# Patient Record
Sex: FEMALE | Race: WHITE | NOT HISPANIC OR LATINO | Employment: UNEMPLOYED | URBAN - METROPOLITAN AREA
[De-identification: names, ages, dates, MRNs, and addresses within clinical notes are randomized per-mention and may not be internally consistent; named-entity substitution may affect disease eponyms.]

---

## 2021-07-20 ENCOUNTER — TRANSCRIBE ORDERS (OUTPATIENT)
Dept: OTHER | Facility: OTHER | Age: 26
End: 2021-07-20

## 2021-07-20 ENCOUNTER — LAB (OUTPATIENT)
Dept: LAB | Facility: HOSPITAL | Age: 26
End: 2021-07-20

## 2021-07-20 DIAGNOSIS — Z3A.10 10 WEEKS GESTATION OF PREGNANCY: ICD-10-CM

## 2021-07-20 DIAGNOSIS — Z34.81 PRENATAL CARE, SUBSEQUENT PREGNANCY, FIRST TRIMESTER: ICD-10-CM

## 2021-07-20 DIAGNOSIS — Z34.81 PRENATAL CARE, SUBSEQUENT PREGNANCY, FIRST TRIMESTER: Primary | ICD-10-CM

## 2021-07-20 LAB
ABO GROUP BLD: NORMAL
BLD GP AB SCN SERPL QL: NEGATIVE
RH BLD: POSITIVE
T&S EXPIRATION DATE: NORMAL

## 2021-07-20 PROCEDURE — 87086 URINE CULTURE/COLONY COUNT: CPT

## 2021-07-20 PROCEDURE — 86803 HEPATITIS C AB TEST: CPT

## 2021-07-20 PROCEDURE — 36415 COLL VENOUS BLD VENIPUNCTURE: CPT

## 2021-07-20 PROCEDURE — 87186 SC STD MICRODIL/AGAR DIL: CPT

## 2021-07-20 PROCEDURE — 86765 RUBEOLA ANTIBODY: CPT

## 2021-07-20 PROCEDURE — 86735 MUMPS ANTIBODY: CPT

## 2021-07-20 PROCEDURE — 87077 CULTURE AEROBIC IDENTIFY: CPT

## 2021-07-20 PROCEDURE — 80081 OBSTETRIC PANEL INC HIV TSTG: CPT

## 2021-07-21 LAB
BASOPHILS # BLD AUTO: 0.04 10*3/MM3 (ref 0–0.2)
BASOPHILS NFR BLD AUTO: 0.4 % (ref 0–1.5)
DEPRECATED RDW RBC AUTO: 40.7 FL (ref 37–54)
EOSINOPHIL # BLD AUTO: 0.01 10*3/MM3 (ref 0–0.4)
EOSINOPHIL NFR BLD AUTO: 0.1 % (ref 0.3–6.2)
ERYTHROCYTE [DISTWIDTH] IN BLOOD BY AUTOMATED COUNT: 11.9 % (ref 12.3–15.4)
HBV SURFACE AG SERPL QL IA: NORMAL
HCT VFR BLD AUTO: 39.2 % (ref 34–46.6)
HCV AB SER DONR QL: NORMAL
HGB BLD-MCNC: 13.2 G/DL (ref 12–15.9)
HIV1+2 AB SER QL: NORMAL
IMM GRANULOCYTES # BLD AUTO: 0.03 10*3/MM3 (ref 0–0.05)
IMM GRANULOCYTES NFR BLD AUTO: 0.3 % (ref 0–0.5)
LYMPHOCYTES # BLD AUTO: 1.6 10*3/MM3 (ref 0.7–3.1)
LYMPHOCYTES NFR BLD AUTO: 16.3 % (ref 19.6–45.3)
MCH RBC QN AUTO: 31.5 PG (ref 26.6–33)
MCHC RBC AUTO-ENTMCNC: 33.7 G/DL (ref 31.5–35.7)
MCV RBC AUTO: 93.6 FL (ref 79–97)
MEV IGG SER IA-ACNC: 209 AU/ML
MONOCYTES # BLD AUTO: 0.44 10*3/MM3 (ref 0.1–0.9)
MONOCYTES NFR BLD AUTO: 4.5 % (ref 5–12)
MUV IGG SER IA-ACNC: 18.9 AU/ML
NEUTROPHILS NFR BLD AUTO: 7.68 10*3/MM3 (ref 1.7–7)
NEUTROPHILS NFR BLD AUTO: 78.4 % (ref 42.7–76)
NRBC BLD AUTO-RTO: 0 /100 WBC (ref 0–0.2)
PLATELET # BLD AUTO: 194 10*3/MM3 (ref 140–450)
PMV BLD AUTO: 12.1 FL (ref 6–12)
RBC # BLD AUTO: 4.19 10*6/MM3 (ref 3.77–5.28)
RPR SER QL: NON REACTIVE
RUBV IGG SERPL IA-ACNC: 12.5 INDEX
WBC # BLD AUTO: 9.8 10*3/MM3 (ref 3.4–10.8)

## 2021-07-22 LAB — BACTERIA SPEC AEROBE CULT: ABNORMAL

## 2021-08-01 ENCOUNTER — HOSPITAL ENCOUNTER (EMERGENCY)
Facility: HOSPITAL | Age: 26
Discharge: HOME | End: 2021-08-01
Attending: EMERGENCY MEDICINE | Admitting: EMERGENCY MEDICINE

## 2021-08-01 VITALS
OXYGEN SATURATION: 100 % | RESPIRATION RATE: 18 BRPM | SYSTOLIC BLOOD PRESSURE: 105 MMHG | DIASTOLIC BLOOD PRESSURE: 66 MMHG | BODY MASS INDEX: 23.05 KG/M2 | WEIGHT: 135 LBS | HEIGHT: 64 IN | HEART RATE: 76 BPM | TEMPERATURE: 98 F

## 2021-08-01 DIAGNOSIS — O23.41 URINARY TRACT INFECTION IN MOTHER DURING FIRST TRIMESTER OF PREGNANCY: Primary | ICD-10-CM

## 2021-08-01 LAB
ALBUMIN SERPL-MCNC: 4.49 G/DL (ref 3.5–5.2)
ALBUMIN/GLOB SERPL: 1.8 G/DL
ALP SERPL-CCNC: 42 U/L (ref 39–117)
ALT SERPL W P-5'-P-CCNC: <5 U/L (ref 1–33)
ANION GAP SERPL CALCULATED.3IONS-SCNC: 12.9 MMOL/L (ref 5–15)
AST SERPL-CCNC: 13 U/L (ref 1–32)
BACTERIA UR QL AUTO: ABNORMAL /HPF
BASOPHILS # BLD AUTO: 0.04 10*3/MM3 (ref 0–0.2)
BASOPHILS NFR BLD AUTO: 0.4 % (ref 0–1.5)
BILIRUB SERPL-MCNC: 0.3 MG/DL (ref 0–1.2)
BILIRUB UR QL STRIP: NEGATIVE
BUN SERPL-MCNC: 7 MG/DL (ref 6–20)
BUN/CREAT SERPL: 10.3 (ref 7–25)
CALCIUM SPEC-SCNC: 9.4 MG/DL (ref 8.6–10.5)
CHLORIDE SERPL-SCNC: 104 MMOL/L (ref 98–107)
CLARITY UR: ABNORMAL
CO2 SERPL-SCNC: 20.1 MMOL/L (ref 22–29)
COLOR UR: ABNORMAL
CREAT SERPL-MCNC: 0.68 MG/DL (ref 0.57–1)
D-LACTATE SERPL-SCNC: 2 MMOL/L (ref 0.5–2)
DEPRECATED RDW RBC AUTO: 40.6 FL (ref 37–54)
EOSINOPHIL # BLD AUTO: 0.01 10*3/MM3 (ref 0–0.4)
EOSINOPHIL NFR BLD AUTO: 0.1 % (ref 0.3–6.2)
ERYTHROCYTE [DISTWIDTH] IN BLOOD BY AUTOMATED COUNT: 12.1 % (ref 12.3–15.4)
GFR SERPL CREATININE-BSD FRML MDRD: 104 ML/MIN/1.73
GLOBULIN UR ELPH-MCNC: 2.5 GM/DL
GLUCOSE SERPL-MCNC: 113 MG/DL (ref 65–99)
GLUCOSE UR STRIP-MCNC: NEGATIVE MG/DL
HCG INTACT+B SERPL-ACNC: NORMAL MIU/ML
HCT VFR BLD AUTO: 39 % (ref 34–46.6)
HGB BLD-MCNC: 13.1 G/DL (ref 12–15.9)
HGB UR QL STRIP.AUTO: NEGATIVE
HOLD SPECIMEN: NORMAL
HOLD SPECIMEN: NORMAL
HYALINE CASTS UR QL AUTO: ABNORMAL /LPF
IMM GRANULOCYTES # BLD AUTO: 0.03 10*3/MM3 (ref 0–0.05)
IMM GRANULOCYTES NFR BLD AUTO: 0.3 % (ref 0–0.5)
KETONES UR QL STRIP: NEGATIVE
LEUKOCYTE ESTERASE UR QL STRIP.AUTO: ABNORMAL
LIPASE SERPL-CCNC: 19 U/L (ref 13–60)
LYMPHOCYTES # BLD AUTO: 1.81 10*3/MM3 (ref 0.7–3.1)
LYMPHOCYTES NFR BLD AUTO: 18.2 % (ref 19.6–45.3)
MAGNESIUM SERPL-MCNC: 1.8 MG/DL (ref 1.6–2.6)
MCH RBC QN AUTO: 31 PG (ref 26.6–33)
MCHC RBC AUTO-ENTMCNC: 33.6 G/DL (ref 31.5–35.7)
MCV RBC AUTO: 92.2 FL (ref 79–97)
MONOCYTES # BLD AUTO: 0.51 10*3/MM3 (ref 0.1–0.9)
MONOCYTES NFR BLD AUTO: 5.1 % (ref 5–12)
NEUTROPHILS NFR BLD AUTO: 7.54 10*3/MM3 (ref 1.7–7)
NEUTROPHILS NFR BLD AUTO: 75.9 % (ref 42.7–76)
NITRITE UR QL STRIP: POSITIVE
NRBC BLD AUTO-RTO: 0 /100 WBC (ref 0–0.2)
PH UR STRIP.AUTO: 7.5 [PH] (ref 5–8)
PLATELET # BLD AUTO: 197 10*3/MM3 (ref 140–450)
PMV BLD AUTO: 10.2 FL (ref 6–12)
POTASSIUM SERPL-SCNC: 2.9 MMOL/L (ref 3.5–5.2)
PROT SERPL-MCNC: 7 G/DL (ref 6–8.5)
PROT UR QL STRIP: ABNORMAL
RBC # BLD AUTO: 4.23 10*6/MM3 (ref 3.77–5.28)
RBC # UR: ABNORMAL /HPF
REF LAB TEST METHOD: ABNORMAL
SODIUM SERPL-SCNC: 137 MMOL/L (ref 136–145)
SP GR UR STRIP: 1.02 (ref 1–1.03)
SQUAMOUS #/AREA URNS HPF: ABNORMAL /HPF
UROBILINOGEN UR QL STRIP: ABNORMAL
WBC # BLD AUTO: 9.94 10*3/MM3 (ref 3.4–10.8)
WBC UR QL AUTO: ABNORMAL /HPF
WHOLE BLOOD HOLD SPECIMEN: NORMAL

## 2021-08-01 PROCEDURE — 83605 ASSAY OF LACTIC ACID: CPT | Performed by: EMERGENCY MEDICINE

## 2021-08-01 PROCEDURE — 84702 CHORIONIC GONADOTROPIN TEST: CPT | Performed by: EMERGENCY MEDICINE

## 2021-08-01 PROCEDURE — 25010000002 CEFTRIAXONE PER 250 MG: Performed by: EMERGENCY MEDICINE

## 2021-08-01 PROCEDURE — 85025 COMPLETE CBC W/AUTO DIFF WBC: CPT | Performed by: EMERGENCY MEDICINE

## 2021-08-01 PROCEDURE — 80053 COMPREHEN METABOLIC PANEL: CPT | Performed by: EMERGENCY MEDICINE

## 2021-08-01 PROCEDURE — 83690 ASSAY OF LIPASE: CPT | Performed by: EMERGENCY MEDICINE

## 2021-08-01 PROCEDURE — 87040 BLOOD CULTURE FOR BACTERIA: CPT | Performed by: EMERGENCY MEDICINE

## 2021-08-01 PROCEDURE — 99284 EMERGENCY DEPT VISIT MOD MDM: CPT

## 2021-08-01 PROCEDURE — 83735 ASSAY OF MAGNESIUM: CPT | Performed by: EMERGENCY MEDICINE

## 2021-08-01 PROCEDURE — 96365 THER/PROPH/DIAG IV INF INIT: CPT

## 2021-08-01 PROCEDURE — 81001 URINALYSIS AUTO W/SCOPE: CPT | Performed by: EMERGENCY MEDICINE

## 2021-08-01 RX ORDER — ACETAMINOPHEN 325 MG/1
650 TABLET ORAL ONCE
Status: COMPLETED | OUTPATIENT
Start: 2021-08-01 | End: 2021-08-01

## 2021-08-01 RX ORDER — POTASSIUM CHLORIDE 20 MEQ/1
80 TABLET, EXTENDED RELEASE ORAL ONCE
Status: COMPLETED | OUTPATIENT
Start: 2021-08-01 | End: 2021-08-01

## 2021-08-01 RX ORDER — POTASSIUM CHLORIDE 750 MG/1
10 TABLET, FILM COATED, EXTENDED RELEASE ORAL DAILY
Qty: 7 TABLET | Refills: 0 | Status: SHIPPED | OUTPATIENT
Start: 2021-08-01 | End: 2021-08-08

## 2021-08-01 RX ORDER — NITROFURANTOIN 25; 75 MG/1; MG/1
100 CAPSULE ORAL 2 TIMES DAILY
Qty: 20 CAPSULE | Refills: 0 | Status: ON HOLD | OUTPATIENT
Start: 2021-08-01 | End: 2021-12-30

## 2021-08-01 RX ADMIN — ACETAMINOPHEN 650 MG: 325 TABLET ORAL at 08:41

## 2021-08-01 RX ADMIN — POTASSIUM CHLORIDE 80 MEQ: 20 TABLET, EXTENDED RELEASE ORAL at 08:41

## 2021-08-01 RX ADMIN — SODIUM CHLORIDE 2 G: 9 INJECTION, SOLUTION INTRAVENOUS at 08:42

## 2021-08-01 RX ADMIN — SODIUM CHLORIDE 1000 ML: 9 INJECTION, SOLUTION INTRAVENOUS at 07:41

## 2021-08-01 RX ADMIN — SODIUM CHLORIDE 1000 ML: 9 INJECTION, SOLUTION INTRAVENOUS at 06:45

## 2021-08-03 NOTE — ED PROVIDER NOTES
Subjective     History provided by:  Patient   used: No    Abdominal Pain  Pain location:  Generalized  Pain quality: aching, cramping and dull    Pain radiates to:  Does not radiate  Pain severity:  Mild (2/10 on the pain severity scale.)  Onset quality:  Gradual  Timing:  Constant  Progression:  Worsening  Chronicity:  New  Context: not alcohol use, not awakening from sleep, not diet changes, not eating, not laxative use, not medication withdrawal, not previous surgeries, not recent illness, not recent sexual activity, not recent travel, not retching, not sick contacts, not suspicious food intake and not trauma    Relieved by:  Nothing  Worsened by:  Nothing  Ineffective treatments:  None tried  Associated symptoms: nausea    Associated symptoms: no anorexia, no belching, no chest pain, no chills, no constipation, no cough, no diarrhea, no dysuria, no fatigue, no fever, no flatus, no hematemesis, no hematochezia, no hematuria, no melena, no shortness of breath, no sore throat, no vaginal bleeding, no vaginal discharge and no vomiting    Risk factors: no alcohol abuse, no aspirin use, not elderly, has not had multiple surgeries, no NSAID use, not obese, not pregnant and no recent hospitalization        Review of Systems   Constitutional: Negative for activity change, appetite change, chills, diaphoresis, fatigue and fever.   HENT: Negative for congestion, ear pain and sore throat.    Eyes: Negative for redness.   Respiratory: Negative for cough, chest tightness, shortness of breath and wheezing.    Cardiovascular: Negative for chest pain, palpitations and leg swelling.   Gastrointestinal: Positive for abdominal pain and nausea. Negative for anorexia, constipation, diarrhea, flatus, hematemesis, hematochezia, melena and vomiting.   Genitourinary: Negative for dysuria, hematuria, urgency, vaginal bleeding and vaginal discharge.   Musculoskeletal: Negative for arthralgias, back pain, myalgias and  neck pain.   Skin: Negative for pallor, rash and wound.   Neurological: Negative for dizziness, speech difficulty, weakness and headaches.   Psychiatric/Behavioral: Negative for agitation, behavioral problems, confusion and decreased concentration.   All other systems reviewed and are negative.      No past medical history on file.    No Known Allergies    No past surgical history on file.    No family history on file.    Social History     Socioeconomic History   • Marital status:      Spouse name: Not on file   • Number of children: Not on file   • Years of education: Not on file   • Highest education level: Not on file           Objective   Physical Exam  Vitals and nursing note reviewed.   Constitutional:       General: She is not in acute distress.     Appearance: Normal appearance. She is well-developed. She is not toxic-appearing or diaphoretic.   HENT:      Head: Normocephalic and atraumatic.      Right Ear: External ear normal.      Left Ear: External ear normal.      Nose: Nose normal.      Mouth/Throat:      Pharynx: No oropharyngeal exudate.      Tonsils: No tonsillar exudate.   Eyes:      General: Lids are normal.      Conjunctiva/sclera: Conjunctivae normal.      Pupils: Pupils are equal, round, and reactive to light.   Neck:      Thyroid: No thyromegaly.   Cardiovascular:      Rate and Rhythm: Normal rate and regular rhythm.      Pulses: Normal pulses.      Heart sounds: Normal heart sounds, S1 normal and S2 normal.   Pulmonary:      Effort: Pulmonary effort is normal. No tachypnea or respiratory distress.      Breath sounds: Normal breath sounds. No decreased breath sounds, wheezing or rales.   Chest:      Chest wall: No tenderness.   Abdominal:      General: Bowel sounds are normal. There is no distension.      Palpations: Abdomen is soft.      Tenderness: There is abdominal tenderness. There is no guarding or rebound.   Musculoskeletal:         General: No tenderness or deformity. Normal  range of motion.      Cervical back: Full passive range of motion without pain, normal range of motion and neck supple.   Lymphadenopathy:      Cervical: No cervical adenopathy.   Skin:     General: Skin is warm and dry.      Coloration: Skin is not pale.      Findings: No erythema or rash.   Neurological:      Mental Status: She is alert and oriented to person, place, and time.      GCS: GCS eye subscore is 4. GCS verbal subscore is 5. GCS motor subscore is 6.      Cranial Nerves: No cranial nerve deficit.      Sensory: No sensory deficit.   Psychiatric:         Speech: Speech normal.         Behavior: Behavior normal.         Thought Content: Thought content normal.         Judgment: Judgment normal.         Procedures           ED Course                                           MDM  Number of Diagnoses or Management Options  Urinary tract infection in mother during first trimester of pregnancy: new and requires workup     Amount and/or Complexity of Data Reviewed  Clinical lab tests: reviewed and ordered  Tests in the radiology section of CPT®: ordered and reviewed  Tests in the medicine section of CPT®: ordered and reviewed  Review and summarize past medical records: yes  Independent visualization of images, tracings, or specimens: yes    Risk of Complications, Morbidity, and/or Mortality  Presenting problems: moderate  Diagnostic procedures: moderate  Management options: moderate    Patient Progress  Patient progress: stable      Final diagnoses:   Urinary tract infection in mother during first trimester of pregnancy       ED Disposition  ED Disposition     ED Disposition Condition Comment    Discharge Stable           PATIENT CONNECTION - [facility]  See Provider List  [facility] [place] [address]  [phone]  Schedule an appointment as soon as possible for a visit in 1 day  EVALUATE         Medication List      New Prescriptions    nitrofurantoin (macrocrystal-monohydrate) 100 MG capsule  Commonly known as:  MACROBID  Take 1 capsule by mouth 2 (Two) Times a Day.     potassium chloride 10 MEQ CR tablet  Take 1 tablet by mouth Daily for 7 days.           Where to Get Your Medications      You can get these medications from any pharmacy    Bring a paper prescription for each of these medications  · nitrofurantoin (macrocrystal-monohydrate) 100 MG capsule  · potassium chloride 10 MEQ CR tablet          Cristóbal Menard MD  08/03/21 [id number]

## 2021-08-06 LAB
BACTERIA SPEC AEROBE CULT: NORMAL
BACTERIA SPEC AEROBE CULT: NORMAL

## 2021-11-17 LAB
EXTERNAL GLUCOSE TOLERANCE TEST FASTING: 76 MG/DL
EXTERNAL GTT 1 HOUR: 147
EXTERNAL GTT 3 HOURS: 112

## 2021-12-06 ENCOUNTER — HOSPITAL ENCOUNTER (OUTPATIENT)
Facility: HOSPITAL | Age: 26
Discharge: HOME | End: 2021-12-06
Attending: OBSTETRICS & GYNECOLOGY | Admitting: OBSTETRICS & GYNECOLOGY

## 2021-12-06 VITALS
DIASTOLIC BLOOD PRESSURE: 64 MMHG | OXYGEN SATURATION: 100 % | HEART RATE: 90 BPM | WEIGHT: 121.8 LBS | SYSTOLIC BLOOD PRESSURE: 109 MMHG | BODY MASS INDEX: 20.79 KG/M2 | HEIGHT: 64 IN | RESPIRATION RATE: 18 BRPM | TEMPERATURE: 98.7 F

## 2021-12-06 PROBLEM — O60.00 PRETERM LABOR: Status: ACTIVE | Noted: 2021-12-06

## 2021-12-06 PROCEDURE — 96372 THER/PROPH/DIAG INJ SC/IM: CPT

## 2021-12-06 PROCEDURE — 25010000002 BETAMETHASONE ACET & SOD PHOS PER 4 MG: Performed by: OBSTETRICS & GYNECOLOGY

## 2021-12-06 PROCEDURE — 59025 FETAL NON-STRESS TEST: CPT

## 2021-12-06 PROCEDURE — G0463 HOSPITAL OUTPT CLINIC VISIT: HCPCS

## 2021-12-06 RX ORDER — BETAMETHASONE SODIUM PHOSPHATE AND BETAMETHASONE ACETATE 3; 3 MG/ML; MG/ML
12 INJECTION, SUSPENSION INTRA-ARTICULAR; INTRALESIONAL; INTRAMUSCULAR; SOFT TISSUE EVERY 24 HOURS
Status: DISCONTINUED | OUTPATIENT
Start: 2021-12-06 | End: 2021-12-06 | Stop reason: HOSPADM

## 2021-12-06 RX ADMIN — BETAMETHASONE SODIUM PHOSPHATE AND BETAMETHASONE ACETATE 12 MG: 3; 3 INJECTION, SUSPENSION INTRA-ARTICULAR; INTRALESIONAL; INTRAMUSCULAR at 17:31

## 2021-12-06 NOTE — NON STRESS TEST
Dulce Boateng, a  at 29w6d with an JOSE of 2/15/2022, Date entered prior to episode creation, was seen at [facility] LABOR DELIVERY for a nonstress test.    Chief Complaint   Patient presents with   • Decreased Fetal Movement     nst and celestone       Patient Active Problem List   Diagnosis   •  labor       Start Time:   Stop Time:     Interpretation A  Nonstress Test Interpretation A: Reactive (21 : Neyda Downey, RN)  Comments A: Verified by HARMONY Pandya RN (21 : Neyda Downey, RN)

## 2021-12-07 ENCOUNTER — HOSPITAL ENCOUNTER (OUTPATIENT)
Facility: HOSPITAL | Age: 26
Discharge: HOME | End: 2021-12-07
Attending: OBSTETRICS & GYNECOLOGY | Admitting: OBSTETRICS & GYNECOLOGY

## 2021-12-07 ENCOUNTER — HOSPITAL ENCOUNTER (OUTPATIENT)
Facility: HOSPITAL | Age: 26
End: 2021-12-07
Attending: OBSTETRICS & GYNECOLOGY | Admitting: OBSTETRICS & GYNECOLOGY

## 2021-12-07 VITALS
TEMPERATURE: 98.5 F | RESPIRATION RATE: 18 BRPM | HEIGHT: 64 IN | BODY MASS INDEX: 25.9 KG/M2 | WEIGHT: 151.7 LBS | HEART RATE: 94 BPM | DIASTOLIC BLOOD PRESSURE: 72 MMHG | SYSTOLIC BLOOD PRESSURE: 112 MMHG

## 2021-12-07 PROBLEM — Z34.90 PREGNANCY: Status: ACTIVE | Noted: 2021-12-07

## 2021-12-07 PROCEDURE — 96372 THER/PROPH/DIAG INJ SC/IM: CPT

## 2021-12-07 PROCEDURE — 25010000002 BETAMETHASONE ACET & SOD PHOS PER 4 MG: Performed by: OBSTETRICS & GYNECOLOGY

## 2021-12-07 PROCEDURE — G0463 HOSPITAL OUTPT CLINIC VISIT: HCPCS

## 2021-12-07 PROCEDURE — 59025 FETAL NON-STRESS TEST: CPT

## 2021-12-07 RX ORDER — BETAMETHASONE SODIUM PHOSPHATE AND BETAMETHASONE ACETATE 3; 3 MG/ML; MG/ML
12 INJECTION, SUSPENSION INTRA-ARTICULAR; INTRALESIONAL; INTRAMUSCULAR; SOFT TISSUE EVERY 24 HOURS
Status: COMPLETED | OUTPATIENT
Start: 2021-12-07 | End: 2021-12-07

## 2021-12-07 RX ADMIN — BETAMETHASONE SODIUM PHOSPHATE AND BETAMETHASONE ACETATE 12 MG: 3; 3 INJECTION, SUSPENSION INTRA-ARTICULAR; INTRALESIONAL; INTRAMUSCULAR at 16:11

## 2021-12-07 NOTE — NON STRESS TEST
Dulce Boateng, a  at 30w0d with an JOSE of 2/15/2022, Date entered prior to episode creation, was seen at [facility] LABOR DELIVERY for a nonstress test.    Chief Complaint   Patient presents with   • Non-stress Test     Receiving second dose of Celestone       Patient Active Problem List   Diagnosis   •  labor   • Pregnancy       Start Time:   Stop Time: 163    Interpretation A  Nonstress Test Interpretation A: Reactive (21 : Neyda Downey, RN)  Comments A: Verified by NAYA Terrazas RN (21 : Neyda Downey, RN)

## 2021-12-14 ENCOUNTER — TELEPHONE (OUTPATIENT)
Dept: OBSTETRICS AND GYNECOLOGY | Facility: HOSPITAL | Age: 26
End: 2021-12-14

## 2021-12-14 LAB
EXTERNAL CHLAMYDIA SCREEN: NORMAL
EXTERNAL GONORRHEA SCREEN: NORMAL

## 2021-12-14 NOTE — TELEPHONE ENCOUNTER
Maternal Child Initial Intake    Patient Name: Dulce Boateng     Preferred Name: Dulce     YOB: 1995     Patient E-mail Address: [email or web address]                Patient gives permission for information/resources to be emailed: yes    Currently Employed: No    How well do you speak English? Very Well     Services: No    Language Spoken/Preferred English    Willingness to participate in [employer]: yes    Home Phone: [phone]  Cell Phone: [phone]  Alternate/Work/School Phone: none  Best Time for Contacting: anytime  Best Method for Contacting: Home  May we contact you by phone: yes  May we contact you by mail: yes  Highest Level of Education to Date: Associate Degree (e.g., AA, AS)    Professional Contacts  Type of Provider Name Next Appointment   OB/GYN Provider:     Primary Care Provider:       Dental Provider:     Speciality Provider:      Pediatrician Chosen         Have you seen dentist in last 6 months: [] YES    [] NO    Other Providers/Services Presently Utilizing:   None    Pregnancy Confirmed: Yes  No LMP recorded. Patient is pregnant. No LMP recorded. Patient is pregnant.   JOSE: 02/15/2022 Based Upon Other  Feeding Plan: [x] Breast [] Bottle  Current Pregnancy Related Symptoms, Concerns, or Questions: none  No Known Allergies   Prior to Admission medications    Medication Sig Start Date End Date Taking? Authorizing Provider   folic acid (FOLVITE) 1 MG tablet Take 1 tablet by mouth Daily. 6/29/21      nitrofurantoin, macrocrystal-monohydrate, (MACROBID) 100 MG capsule Take 1 capsule by mouth 2 (Two) Times a Day. 8/1/21   Cristóbal Menard MD   Prenatal Vit-Fe Fumarate-FA (Prenatal Vitamin) 27-0.8 MG tablet Take 1 tablet by mouth Daily. 6/29/21      Progesterone (PROMETRIUM) 100 MG capsule Take 2 capsules by mouth Every Evening. 6/29/21         OB/GYN Specific History:   None    No LMP recorded. Patient is pregnant.   Estimated Date of Delivery: 2/15/22    Pregnancy History:   Social History:   Sexually Active: Yes Partners: Male  Birth Control/Protection Post Delivery: undecided    Pregnancy History:  Current Pregnancy Baby Sex: Female  Date: GA (wks) Birth Wt Sex Del Type Place of Del Comments/Complications Living (Y/N)                                                                           Past Medical History:   Diagnosis Date   • Migraine       Past Surgical History:   Procedure Laterality Date   • TEAR DUCT SURGERY Bilateral 1994   • TONSILLECTOMY        Implants: none  Family History   Problem Relation Age of Onset   • Asthma Daughter    • Hypertension Maternal Grandmother    • COPD Paternal Grandfather    • Diabetes Paternal Grandfather         Social History:  Smoking Status: Never a smoker.  Cigarettes   Passive Exposure: yes  Counseling Given: yes  Smokeless Tobacco: Never   Alcohol Use: No   Drinks/wk: 0   Substance Use History: No  Substances Used & Use/Wk: 0    [facility] Domestic Violence/Abuse Screening  Within the last year, have you been:  Humiliated or emotionally abused in other ways by your partner or ex-partner no  Afraid of your partner or ex-partner no  Raped or forced to have any kind of sexual activity by your partner or ex-partner no  Kicked, hit, slapped, or otherwise physically hurt by your partner or ex-partner no  Feels Unsafe at home or work/school: no  Feels Threatened by Someone no  Does anyone try to keep you from having contact with others/doing things outside your home: no  History of physical, mental, emotional, financial abuse/neglect: no    Spiritual, Cultural, Religous, Value Awareness  Any Spiritual, Cultural, or Jew Beliefs/Practices/Values that we need to be mindful of throughout your maternity experience: no    Resource/Environmental Concerns:  Current Living Arrangement: home/apt/condo  Resource/Environmental Concerns:   None    Disability/Function:  Do you have:  Difficulty Hearing or Deaf: no  Difficulty  Seeing or Blind: no  Difficulty Concentrating or with Decision-Making: no  Difficulty Communicating: no  Difficulty with Comprehension/Understanding of Information Provided: no  Difficulty with Performing Activities of Daily Living: no    Accommodations/Assistive Devices Utilized (e.g. hearing aids, sign language, braille, service animal, /aide, etc.): none    Equipment Presently Utilized/Available at Home: Other none  Education:   Preferred Language for Discussing Healthcare: English  Ability to Read: yes  Ability to Write: yes    Preferred learning method: listening, reading and demonstration  Learning Barriers: none  Topic Education Information Comments        Hospital Education Programs [] Provided                         [] Acknowledged                [] Refused    [facility] Maternal-Child Department [] Provided                         [] Acknowledged                [] Refused    Signs or Symptoms to Report [] Provided                         [] Acknowledged                [] Refused    Other:  [] Provided                         [] Acknowledged                [] Refused    Comments:       Significant Other/Support Person: Name:Griffin Boateng                        Relationship: spouse  Do you have [facility]?  [x] YES    [] NO   Specific Resources Provided and Method: How to find a pediatrician, How to find a primary care provider, Insurance Benefits/Incentives, Smoking/Vaping Sensation, SNAP Benefits, WIC Benefits Sent via: [facility]    Do you have the [employer]?  [] YES    [x] NO     Intake Summary   [x] Intake completed, will follow-up with patient: yes  [x] Discussed community resources and information provided or assisted with appointments (see notes)  [] Other, including any provider notifications (see notes)  [] Declines Project [employer] Participation  Oxana Long RN   Maternal Nurse Navigator

## 2021-12-29 ENCOUNTER — OFFICE VISIT (OUTPATIENT)
Dept: SURGERY | Facility: CLINIC | Age: 26
End: 2021-12-29

## 2021-12-29 VITALS
DIASTOLIC BLOOD PRESSURE: 70 MMHG | HEART RATE: 62 BPM | WEIGHT: 153 LBS | HEIGHT: 64 IN | BODY MASS INDEX: 26.12 KG/M2 | SYSTOLIC BLOOD PRESSURE: 110 MMHG

## 2021-12-29 DIAGNOSIS — Z3A.33 33 WEEKS GESTATION OF PREGNANCY: Primary | ICD-10-CM

## 2021-12-29 DIAGNOSIS — K64.2 GRADE III HEMORRHOIDS: ICD-10-CM

## 2021-12-29 PROCEDURE — 99203 OFFICE O/P NEW LOW 30 MIN: CPT | Performed by: SURGERY

## 2021-12-29 RX ORDER — PRAMOXINE HYDROCHLORIDE 10 MG/G
AEROSOL, FOAM TOPICAL EVERY 8 HOURS PRN
Qty: 15 G | Refills: 1 | Status: ON HOLD | OUTPATIENT
Start: 2021-12-29 | End: 2021-12-30

## 2021-12-30 ENCOUNTER — HOSPITAL ENCOUNTER (INPATIENT)
Facility: HOSPITAL | Age: 26
LOS: 2 days | Discharge: HOME | End: 2022-01-01
Attending: OBSTETRICS & GYNECOLOGY | Admitting: OBSTETRICS & GYNECOLOGY

## 2021-12-30 LAB
ABO GROUP BLD: NORMAL
BILIRUB UR QL STRIP: NEGATIVE
BLD GP AB SCN SERPL QL: NEGATIVE
CLARITY UR: ABNORMAL
COLOR UR: YELLOW
DEPRECATED RDW RBC AUTO: 43.1 FL (ref 37–54)
ERYTHROCYTE [DISTWIDTH] IN BLOOD BY AUTOMATED COUNT: 13.1 % (ref 12.3–15.4)
GLUCOSE UR STRIP-MCNC: NEGATIVE MG/DL
HCT VFR BLD AUTO: 35.3 % (ref 34–46.6)
HGB BLD-MCNC: 11.2 G/DL (ref 12–15.9)
HGB UR QL STRIP.AUTO: NEGATIVE
KETONES UR QL STRIP: NEGATIVE
LEUKOCYTE ESTERASE UR QL STRIP.AUTO: NEGATIVE
MCH RBC QN AUTO: 29.2 PG (ref 26.6–33)
MCHC RBC AUTO-ENTMCNC: 31.7 G/DL (ref 31.5–35.7)
MCV RBC AUTO: 91.9 FL (ref 79–97)
NITRITE UR QL STRIP: NEGATIVE
PH UR STRIP.AUTO: 7.5 [PH] (ref 5–8)
PLATELET # BLD AUTO: 188 10*3/MM3 (ref 140–450)
PMV BLD AUTO: 11.5 FL (ref 6–12)
PROT UR QL STRIP: NEGATIVE
RBC # BLD AUTO: 3.84 10*6/MM3 (ref 3.77–5.28)
RH BLD: POSITIVE
SARS-COV-2 RNA RESP QL NAA+PROBE: NOT DETECTED
SP GR UR STRIP: 1.02 (ref 1–1.03)
T&S EXPIRATION DATE: NORMAL
UROBILINOGEN UR QL STRIP: ABNORMAL
WBC NRBC COR # BLD: 10.87 10*3/MM3 (ref 3.4–10.8)

## 2021-12-30 PROCEDURE — 86901 BLOOD TYPING SEROLOGIC RH(D): CPT | Performed by: OBSTETRICS & GYNECOLOGY

## 2021-12-30 PROCEDURE — 86900 BLOOD TYPING SEROLOGIC ABO: CPT | Performed by: OBSTETRICS & GYNECOLOGY

## 2021-12-30 PROCEDURE — 86850 RBC ANTIBODY SCREEN: CPT | Performed by: OBSTETRICS & GYNECOLOGY

## 2021-12-30 PROCEDURE — 0 MAGNESIUM SULFATE 20 GM/500ML SOLUTION: Performed by: OBSTETRICS & GYNECOLOGY

## 2021-12-30 PROCEDURE — 25010000002 BETAMETHASONE ACET & SOD PHOS PER 4 MG: Performed by: OBSTETRICS & GYNECOLOGY

## 2021-12-30 PROCEDURE — 81003 URINALYSIS AUTO W/O SCOPE: CPT | Performed by: OBSTETRICS & GYNECOLOGY

## 2021-12-30 PROCEDURE — 59025 FETAL NON-STRESS TEST: CPT

## 2021-12-30 PROCEDURE — 25010000002 BUTORPHANOL PER 1 MG: Performed by: OBSTETRICS & GYNECOLOGY

## 2021-12-30 PROCEDURE — 25010000002 ONDANSETRON PER 1 MG: Performed by: OBSTETRICS & GYNECOLOGY

## 2021-12-30 PROCEDURE — 85027 COMPLETE CBC AUTOMATED: CPT | Performed by: OBSTETRICS & GYNECOLOGY

## 2021-12-30 PROCEDURE — G0463 HOSPITAL OUTPT CLINIC VISIT: HCPCS

## 2021-12-30 PROCEDURE — U0003 INFECTIOUS AGENT DETECTION BY NUCLEIC ACID (DNA OR RNA); SEVERE ACUTE RESPIRATORY SYNDROME CORONAVIRUS 2 (SARS-COV-2) (CORONAVIRUS DISEASE [COVID-19]), AMPLIFIED PROBE TECHNIQUE, MAKING USE OF HIGH THROUGHPUT TECHNOLOGIES AS DESCRIBED BY CMS-2020-01-R: HCPCS | Performed by: OBSTETRICS & GYNECOLOGY

## 2021-12-30 PROCEDURE — P9612 CATHETERIZE FOR URINE SPEC: HCPCS

## 2021-12-30 RX ORDER — SODIUM CHLORIDE, SODIUM LACTATE, POTASSIUM CHLORIDE, CALCIUM CHLORIDE 600; 310; 30; 20 MG/100ML; MG/100ML; MG/100ML; MG/100ML
75 INJECTION, SOLUTION INTRAVENOUS CONTINUOUS
Status: DISCONTINUED | OUTPATIENT
Start: 2021-12-30 | End: 2022-01-01 | Stop reason: HOSPADM

## 2021-12-30 RX ORDER — ONDANSETRON 2 MG/ML
4 INJECTION INTRAMUSCULAR; INTRAVENOUS EVERY 6 HOURS PRN
Status: DISCONTINUED | OUTPATIENT
Start: 2021-12-30 | End: 2022-01-01 | Stop reason: HOSPADM

## 2021-12-30 RX ORDER — ACETAMINOPHEN 325 MG/1
650 TABLET ORAL EVERY 6 HOURS PRN
Status: DISCONTINUED | OUTPATIENT
Start: 2021-12-30 | End: 2022-01-01 | Stop reason: HOSPADM

## 2021-12-30 RX ORDER — BUTORPHANOL TARTRATE 1 MG/ML
1 INJECTION, SOLUTION INTRAMUSCULAR; INTRAVENOUS
Status: DISCONTINUED | OUTPATIENT
Start: 2021-12-30 | End: 2022-01-01 | Stop reason: HOSPADM

## 2021-12-30 RX ORDER — BETAMETHASONE SODIUM PHOSPHATE AND BETAMETHASONE ACETATE 3; 3 MG/ML; MG/ML
12 INJECTION, SUSPENSION INTRA-ARTICULAR; INTRALESIONAL; INTRAMUSCULAR; SOFT TISSUE ONCE
Status: COMPLETED | OUTPATIENT
Start: 2021-12-30 | End: 2021-12-30

## 2021-12-30 RX ORDER — MAGNESIUM SULFATE HEPTAHYDRATE 40 MG/ML
INJECTION, SOLUTION INTRAVENOUS
Status: COMPLETED
Start: 2021-12-30 | End: 2021-12-30

## 2021-12-30 RX ORDER — MAGNESIUM SULFATE HEPTAHYDRATE 40 MG/ML
2 INJECTION, SOLUTION INTRAVENOUS CONTINUOUS
Status: DISCONTINUED | OUTPATIENT
Start: 2021-12-30 | End: 2022-01-01 | Stop reason: HOSPADM

## 2021-12-30 RX ORDER — HYDROXYPROGESTERONE CAPROATE 250 MG/ML
250 INJECTION INTRAMUSCULAR
Status: ON HOLD | COMMUNITY
End: 2022-01-07

## 2021-12-30 RX ORDER — HYDROXYPROGESTERONE CAPROATE 250 MG/ML
250 INJECTION INTRAMUSCULAR
Status: CANCELLED | OUTPATIENT
Start: 2021-12-31

## 2021-12-30 RX ADMIN — ACETAMINOPHEN 650 MG: 325 TABLET ORAL at 21:25

## 2021-12-30 RX ADMIN — WITCH HAZEL: 500 SOLUTION RECTAL; TOPICAL at 21:25

## 2021-12-30 RX ADMIN — MAGNESIUM SULFATE IN WATER 2 G/HR: 40 INJECTION, SOLUTION INTRAVENOUS at 21:21

## 2021-12-30 RX ADMIN — BUTORPHANOL TARTRATE 1 MG: 1 INJECTION, SOLUTION INTRAMUSCULAR; INTRAVENOUS at 21:33

## 2021-12-30 RX ADMIN — MAGNESIUM SULFATE HEPTAHYDRATE 4 G: 40 INJECTION, SOLUTION INTRAVENOUS at 20:50

## 2021-12-30 RX ADMIN — SODIUM CHLORIDE, POTASSIUM CHLORIDE, SODIUM LACTATE AND CALCIUM CHLORIDE 75 ML/HR: 600; 310; 30; 20 INJECTION, SOLUTION INTRAVENOUS at 20:40

## 2021-12-30 RX ADMIN — ONDANSETRON 4 MG: 2 INJECTION INTRAMUSCULAR; INTRAVENOUS at 21:16

## 2021-12-30 RX ADMIN — BETAMETHASONE SODIUM PHOSPHATE AND BETAMETHASONE ACETATE 12 MG: 3; 3 INJECTION, SUSPENSION INTRA-ARTICULAR; INTRALESIONAL; INTRAMUSCULAR at 21:17

## 2021-12-30 RX ADMIN — BENZOCAINE AND LEVOMENTHOL: 200; 5 SPRAY TOPICAL at 21:25

## 2021-12-30 RX ADMIN — BENZOCAINE: 5.6 OINTMENT TOPICAL at 21:25

## 2021-12-30 NOTE — PROGRESS NOTES
Subjective   Dulce Boateng is a 26 y.o. female is being seen for consultation today at the request of Provider, No Known    Dulce Boateng is a 26 y.o. female Who is currently 33 weeks pregnant presenting with severely inflamed external hemorrhoids with grade 3 internal component.  No evidence of ischemia or other complication.  She has only used over-the-counter topical therapy at this time.      Past Medical History:   Diagnosis Date   • Migraine        Family History   Problem Relation Age of Onset   • Asthma Daughter    • Hypertension Maternal Grandmother    • COPD Paternal Grandfather    • Diabetes Paternal Grandfather        Social History     Socioeconomic History   • Marital status:      Spouse name: Griffin Boateng   Tobacco Use   • Smoking status: Never Smoker   • Smokeless tobacco: Never Used   Vaping Use   • Vaping Use: Never used   Substance and Sexual Activity   • Alcohol use: Never   • Drug use: Never       Past Surgical History:   Procedure Laterality Date   • TEAR DUCT SURGERY Bilateral 05/01/1994   • TONSILLECTOMY         Review of Systems   Constitutional: Negative for activity change, appetite change, chills and fever.   HENT: Negative for sore throat and trouble swallowing.    Eyes: Negative for visual disturbance.   Respiratory: Negative for cough and shortness of breath.    Cardiovascular: Negative for chest pain and palpitations.   Gastrointestinal: Positive for rectal pain. Negative for abdominal distention, abdominal pain, blood in stool, constipation, diarrhea, nausea and vomiting.   Endocrine: Negative for cold intolerance and heat intolerance.   Genitourinary: Negative for dysuria.   Musculoskeletal: Negative for joint swelling.   Skin: Negative for color change, rash and wound.   Allergic/Immunologic: Negative for immunocompromised state.   Neurological: Negative for dizziness, seizures, weakness and headaches.   Hematological: Negative for adenopathy. Does not bruise/bleed easily.  "  Psychiatric/Behavioral: Negative for agitation and confusion.         /70   Pulse 62   Ht 162.6 cm (64.02\")   Wt 69.4 kg (153 lb)   BMI 26.25 kg/m²   Objective   Physical Exam  Constitutional:       Appearance: She is well-developed.   HENT:      Head: Normocephalic and atraumatic.   Eyes:      Conjunctiva/sclera: Conjunctivae normal.      Pupils: Pupils are equal, round, and reactive to light.   Neck:      Thyroid: No thyromegaly.      Vascular: No JVD.      Trachea: No tracheal deviation.   Cardiovascular:      Rate and Rhythm: Normal rate and regular rhythm.      Heart sounds: No murmur heard.  No friction rub. No gallop.    Pulmonary:      Effort: Pulmonary effort is normal.      Breath sounds: Normal breath sounds.   Abdominal:      General: There is no distension.      Palpations: Abdomen is soft. There is no hepatomegaly or splenomegaly.      Tenderness: There is no abdominal tenderness.      Hernia: No hernia is present.   Genitourinary:     Rectum: External hemorrhoid and internal hemorrhoid present.   Musculoskeletal:         General: No deformity. Normal range of motion.      Cervical back: Neck supple.   Skin:     General: Skin is warm and dry.   Neurological:      Mental Status: She is alert and oriented to person, place, and time.               Assessment   Diagnoses and all orders for this visit:    1. 33 weeks gestation of pregnancy (Primary)    2. Grade III hemorrhoids    Other orders  -     Pramoxine HCl, Perianal, (Proctofoam) 1 % foam; Insert  into the rectum Every 8 (Eight) Hours As Needed for Hemorrhoids.  Dispense: 15 g; Refill: 1      Dulce Boateng is a 26 y.o. female with 33-week pregnancy and grade 3 hemorrhoids with inflamed external hemorrhoids.  Due to her underlying pregnancy excisional hemorrhoidectomy would carry high risk for  labor and complication.  She will continue medical management until the postpartum period where we will schedule " hemorrhoidectomy.    Patient's Body mass index is 26.25 kg/m². indicating that she is within normal range (BMI 18.5-24.9). No BMI management plan needed..

## 2021-12-31 ENCOUNTER — TELEPHONE (OUTPATIENT)
Dept: OBSTETRICS AND GYNECOLOGY | Facility: HOSPITAL | Age: 26
End: 2021-12-31

## 2021-12-31 LAB — MAGNESIUM SERPL-MCNC: 5.7 MG/DL (ref 1.6–2.6)

## 2021-12-31 PROCEDURE — 83735 ASSAY OF MAGNESIUM: CPT | Performed by: OBSTETRICS & GYNECOLOGY

## 2021-12-31 PROCEDURE — 25010000002 BUTORPHANOL PER 1 MG: Performed by: OBSTETRICS & GYNECOLOGY

## 2021-12-31 PROCEDURE — 0 MAGNESIUM SULFATE 20 GM/500ML SOLUTION: Performed by: OBSTETRICS & GYNECOLOGY

## 2021-12-31 PROCEDURE — G0378 HOSPITAL OBSERVATION PER HR: HCPCS

## 2021-12-31 PROCEDURE — 59025 FETAL NON-STRESS TEST: CPT

## 2021-12-31 RX ORDER — FOLIC ACID 1 MG/1
1000 TABLET ORAL DAILY
Status: DISCONTINUED | OUTPATIENT
Start: 2021-12-31 | End: 2022-01-01 | Stop reason: HOSPADM

## 2021-12-31 RX ORDER — PRENATAL VIT/IRON FUM/FOLIC AC 27MG-0.8MG
1 TABLET ORAL DAILY
Status: DISCONTINUED | OUTPATIENT
Start: 2021-12-31 | End: 2022-01-01 | Stop reason: HOSPADM

## 2021-12-31 RX ADMIN — SODIUM CHLORIDE, POTASSIUM CHLORIDE, SODIUM LACTATE AND CALCIUM CHLORIDE 75 ML/HR: 600; 310; 30; 20 INJECTION, SOLUTION INTRAVENOUS at 23:08

## 2021-12-31 RX ADMIN — MAGNESIUM SULFATE IN WATER 2.5 G/HR: 40 INJECTION, SOLUTION INTRAVENOUS at 03:25

## 2021-12-31 RX ADMIN — SODIUM CHLORIDE, POTASSIUM CHLORIDE, SODIUM LACTATE AND CALCIUM CHLORIDE 75 ML/HR: 600; 310; 30; 20 INJECTION, SOLUTION INTRAVENOUS at 12:23

## 2021-12-31 RX ADMIN — MAGNESIUM SULFATE IN WATER 2 G/HR: 40 INJECTION, SOLUTION INTRAVENOUS at 23:08

## 2021-12-31 RX ADMIN — MAGNESIUM SULFATE IN WATER 2 G/HR: 40 INJECTION, SOLUTION INTRAVENOUS at 14:12

## 2021-12-31 RX ADMIN — BUTORPHANOL TARTRATE 1 MG: 1 INJECTION, SOLUTION INTRAMUSCULAR; INTRAVENOUS at 01:53

## 2021-12-31 RX ADMIN — PRENATAL VIT W/ FE FUMARATE-FA TAB 27-0.8 MG 1 TABLET: 27-0.8 TAB at 09:37

## 2021-12-31 RX ADMIN — FOLIC ACID 1000 MCG: 1 TABLET ORAL at 09:37

## 2022-01-01 VITALS
DIASTOLIC BLOOD PRESSURE: 56 MMHG | HEIGHT: 64 IN | TEMPERATURE: 98.1 F | WEIGHT: 153 LBS | HEART RATE: 71 BPM | OXYGEN SATURATION: 99 % | BODY MASS INDEX: 26.12 KG/M2 | RESPIRATION RATE: 16 BRPM | SYSTOLIC BLOOD PRESSURE: 99 MMHG

## 2022-01-01 PROCEDURE — 59025 FETAL NON-STRESS TEST: CPT

## 2022-01-01 PROCEDURE — 25010000002 ONDANSETRON PER 1 MG: Performed by: OBSTETRICS & GYNECOLOGY

## 2022-01-01 RX ORDER — NIFEDIPINE 30 MG/1
30 TABLET, FILM COATED, EXTENDED RELEASE ORAL
Qty: 30 TABLET | Refills: 0 | Status: SHIPPED | OUTPATIENT
Start: 2022-01-02 | End: 2022-01-11 | Stop reason: HOSPADM

## 2022-01-01 RX ORDER — NIFEDIPINE 30 MG/1
30 TABLET, FILM COATED, EXTENDED RELEASE ORAL
Status: DISCONTINUED | OUTPATIENT
Start: 2022-01-01 | End: 2022-01-01 | Stop reason: HOSPADM

## 2022-01-01 RX ADMIN — ACETAMINOPHEN 650 MG: 325 TABLET ORAL at 12:01

## 2022-01-01 RX ADMIN — ACETAMINOPHEN 650 MG: 325 TABLET ORAL at 05:06

## 2022-01-01 RX ADMIN — WITCH HAZEL: 500 SOLUTION RECTAL; TOPICAL at 12:02

## 2022-01-01 RX ADMIN — FOLIC ACID 1000 MCG: 1 TABLET ORAL at 11:10

## 2022-01-01 RX ADMIN — BENZOCAINE: 5.6 OINTMENT TOPICAL at 11:10

## 2022-01-01 RX ADMIN — NIFEDIPINE 30 MG: 30 TABLET, EXTENDED RELEASE ORAL at 12:01

## 2022-01-01 RX ADMIN — SODIUM CHLORIDE, POTASSIUM CHLORIDE, SODIUM LACTATE AND CALCIUM CHLORIDE 75 ML/HR: 600; 310; 30; 20 INJECTION, SOLUTION INTRAVENOUS at 05:07

## 2022-01-01 RX ADMIN — PRENATAL VIT W/ FE FUMARATE-FA TAB 27-0.8 MG 1 TABLET: 27-0.8 TAB at 11:10

## 2022-01-01 RX ADMIN — ONDANSETRON 4 MG: 2 INJECTION INTRAMUSCULAR; INTRAVENOUS at 05:06

## 2022-01-01 NOTE — PLAN OF CARE
Goal Outcome Evaluation:      Pt. Being d/c home. Pt. Instructed to watch for s/s of pre-term labor and keep all follow-up appointments. Pt. V/u.

## 2022-01-01 NOTE — PROGRESS NOTES
"Subjective    The patient has no complaints.  Only having a rare mild contraction.  She has good fetal movement no vaginal bleeding or loss of fluid.             Objective     /59 (BP Location: Left arm, Patient Position: Lying)   Pulse 89   Temp 98.1 °F (36.7 °C) (Oral)   Resp 16   Ht 162.6 cm (64.02\")   Wt 69.4 kg (153 lb)   SpO2 99%   Breastfeeding Yes   BMI 26.25 kg/m²   General:  alert, appears stated age and cooperative   Abdomen: soft, bowel sounds active, non-tender   Extremities:   No cyanosis clubbing or edema         Assessment and plan   labor-much improved.  We are going to discontinue her magnesium and start her on Procardia XL today.  We will observe her through the day and if she does well we will discharge her home this afternoon.  She has finished her course of  steroids.  "

## 2022-01-01 NOTE — H&P
Patient Care Team:  Provider, No Known as PCP - Oxana Gusman, RN as Nurse Navigator    Chief complaint contractions    Subjective     Patient is a 26 y.o. female  5 para 0-3-1-3 at 33 weeks and 4 days presented secondary to contractions.  She was given Brethine and IV fluids and eventually had to be started on magnesium to get her contractions to stop.  She is doing well this morning.  Only having a mild contraction here and there.  Cervical exam is unchanged.  We have started her  steroids.    Review of Systems   Pertinent items are noted in HPI    History  Past Medical History:   Diagnosis Date   • Migraine      Past Surgical History:   Procedure Laterality Date   • TEAR DUCT SURGERY Bilateral 1994   • TONSILLECTOMY       Family History   Problem Relation Age of Onset   • Asthma Daughter    • Hypertension Maternal Grandmother    • COPD Paternal Grandfather    • Diabetes Paternal Grandfather      Social History     Tobacco Use   • Smoking status: Never Smoker   • Smokeless tobacco: Never Used   Vaping Use   • Vaping Use: Never used   Substance Use Topics   • Alcohol use: Never   • Drug use: Never     E-cigarette/Vaping   • E-cigarette/Vaping Use Never User      E-cigarette/Vaping Substances     Medications Prior to Admission   Medication Sig Dispense Refill Last Dose   • folic acid (FOLVITE) 1 MG tablet Take 1 tablet by mouth Daily. 30 tablet 5 2021 at 0930   • HYDROXYprogesterone caproate (RAQUEL) 250 MG/ML IM injection Inject 250 mg into the appropriate muscle as directed by prescriber Every 7 (Seven) Days. PTA: takes on 2021 at 0930   • Prenatal Vit-Fe Fumarate-FA (Prenatal Vitamin) 27-0.8 MG tablet Take 1 tablet by mouth Daily. 30 tablet 9 2021 at 0930     Allergies:  Patient has no known allergies.    Objective     Vital Signs  Temp:  [98 °F (36.7 °C)-98.5 °F (36.9 °C)] 98.1 °F (36.7 °C)  Heart Rate:  [] 89  Resp:  [16-18] 16  BP:  (101-125)/(57-62) 101/59    Physical Exam:      General Appearance:    Alert, cooperative, in no acute distress   Head:    Normocephalic, without obvious abnormality, atraumatic   Eyes:            Lids and lashes normal, conjunctivae and sclerae normal, no   icterus, no pallor, corneas clear, PERRLA   Ears:    Ears appear intact with no abnormalities noted   Throat:   No oral lesions, no thrush, oral mucosa moist   Neck:   No adenopathy, supple, trachea midline, no thyromegaly, no     carotid bruit, no JVD   Back:     No kyphosis present, no scoliosis present, no skin lesions,       erythema or scars, no tenderness to percussion or                   palpation,   range of motion normal   Lungs:     Clear to auscultation,respirations regular, even and                   unlabored    Heart:    Regular rhythm and normal rate, normal S1 and S2, no            murmur, no gallop, no rub, no click   Breast Exam:    Deferred   Abdomen:     Normal bowel sounds, no masses, no organomegaly, soft        non-tender, non-distended, no guarding, no rebound                 tenderness   Genitalia:   Cervix is 2/50 and -2   Extremities:   Moves all extremities well, no edema, no cyanosis, no              redness   Pulses:   Pulses palpable and equal bilaterally   Skin:   No bleeding, bruising or rash   Lymph nodes:   No palpable adenopathy           Results Review:    I reviewed the patient's new clinical results.    Assessment/Plan        labor    Pregnancy  We will continue the steroids until the morning the benefit of  steroids.  At that point we will stop the magnesium and start her on Procardia XL if she is stable.    I discussed the patient's findings and my recommendations with patient and family.     Griffin Matthews DO  22  11:17 EST

## 2022-01-01 NOTE — DISCHARGE SUMMARY
Date of Discharge: 22     Discharge Diagnosis:   33-week intrauterine pregnancy   labor    Problem List:     labor    Pregnancy      Presenting Problem/History of Present Illness  Pregnancy [Z34.90]   labor [O60.00]      Hospital Course  Patient is a 26 y.o. female presented with contractions.  She required magnesium to get her labor to stop.  She was given betamethasone 12 mg for 2 doses.  Her contractions did resolve and she was observed over 2 nights.  We started her on Procardia XL she will she will continue as an outpatient.  She will follow-up next week in the office.  Procedures Performed         Consults:   Consults     No orders found from 2021 to 2021.          Pertinent Test Results:    Condition on Discharge: Stable  Vital Signs  Temp:  [98 °F (36.7 °C)-98.5 °F (36.9 °C)] 98.1 °F (36.7 °C)  Heart Rate:  [] 89  Resp:  [16-18] 16  BP: (101-125)/(57-62) 101/59    Discharge Disposition      Discharge Medications     Discharge Medications      ASK your doctor about these medications      Instructions Start Date   folic acid 1 MG tablet  Commonly known as: FOLVITE   1,000 mcg, Oral, Daily      HYDROXYprogesterone caproate 250 MG/ML IM injection  Commonly known as: RAQUEL   250 mg, Intramuscular, Every 7 Days, PTA: takes on        prenatal vitamin 27-0.8 27-0.8 MG tablet tablet   1 tablet, Oral, Daily             Discharge Diet       Activity at Discharge      Follow-up Appointments  Future Appointments   Date Time Provider Department Center   2022  1:10 PM Ron Murillo MD [facility]           Time: Discharge 15 min

## 2022-01-01 NOTE — NON STRESS TEST
Dulce Boateng, a  at 33w4d with an JOSE of 2/15/2022, Date entered prior to episode creation, was seen at [facility] LABOR DELIVERY for a nonstress test.    Chief Complaint   Patient presents with   • Contractions     for 4 hours every 5 mins       Patient Active Problem List   Diagnosis   •  labor   • Pregnancy   • Grade III hemorrhoids       Start Time: 0900  Stop Time: 09

## 2022-01-03 NOTE — PAYOR COMM NOTE
"Edwin Dwyer (26 y.o. Female)             Date of Birth Social Security Number Address Home Phone MRN    [id number]  [id number] [facility] [address] [phone] [id number]    Faith Marital Status             None        Admission Date Admission Type Admitting Provider Attending Provider Department, Room/Bed    21 Elective Miriam Matthews DO  [facility] LABOR DELIVERY, L227/    Discharge Date Discharge Disposition Discharge Destination          2022 Home or Self Care              Attending Provider: (none)   Allergies: No Known Allergies    Isolation: None   Infection: None   Code Status: Not on file   Advance Care Planning Activity    Ht: 162.6 cm (64.02\")   Wt: 69.4 kg (153 lb)    Admission Cmt: None   Principal Problem: None                Active Insurance as of 2021     Primary Coverage     Payor Plan Insurance Group Employer/Plan Group    [facility] COMMUNITY PLAN [facility] COMMUNITY PLAN [facility]     Payor Plan Address Payor Plan Phone Number Payor Plan Fax Number Effective Dates    [address]   2021 - None Entered    [facility] [id number]       Subscriber Name Subscriber Birth Date Member ID       EDWIN DWYER 1995 [id number]                 Emergency Contacts      (Rel.) Home Phone Work Phone Mobile Phone    MIRIAM DWYER (Spouse) [phone] -- --            Problem List           Codes Noted - Resolved       Hospital    Pregnancy ICD-10-CM: Z34.90  ICD-9-CM: V22.2 2021 - Present     labor ICD-10-CM: O60.00  ICD-9-CM: 644.00 2021 - Present       Non-Hospital    Grade III hemorrhoids ICD-10-CM: K64.2  ICD-9-CM: 455.0 2021 - Present             History & Physical      Miriam Matthews DO at 21 1117              Patient Care Team:  Provider, No Known as PCP - Oxana Gusman, RN as Nurse Navigator    Chief complaint contractions    Subjective     Patient is a 26 y.o. female  " 5 para 0-3-1-3 at 33 weeks and 4 days presented secondary to contractions.  She was given Brethine and IV fluids and eventually had to be started on magnesium to get her contractions to stop.  She is doing well this morning.  Only having a mild contraction here and there.  Cervical exam is unchanged.  We have started her  steroids.    Review of Systems   Pertinent items are noted in HPI    History  Past Medical History:   Diagnosis Date   • Migraine      Past Surgical History:   Procedure Laterality Date   • TEAR DUCT SURGERY Bilateral 1994   • TONSILLECTOMY       Family History   Problem Relation Age of Onset   • Asthma Daughter    • Hypertension Maternal Grandmother    • COPD Paternal Grandfather    • Diabetes Paternal Grandfather      Social History     Tobacco Use   • Smoking status: Never Smoker   • Smokeless tobacco: Never Used   Vaping Use   • Vaping Use: Never used   Substance Use Topics   • Alcohol use: Never   • Drug use: Never     E-cigarette/Vaping   • E-cigarette/Vaping Use Never User      E-cigarette/Vaping Substances     Medications Prior to Admission   Medication Sig Dispense Refill Last Dose   • folic acid (FOLVITE) 1 MG tablet Take 1 tablet by mouth Daily. 30 tablet 5 2021 at 0930   • HYDROXYprogesterone caproate (RAQUEL) 250 MG/ML IM injection Inject 250 mg into the appropriate muscle as directed by prescriber Every 7 (Seven) Days. PTA: takes on 2021 at 0930   • Prenatal Vit-Fe Fumarate-FA (Prenatal Vitamin) 27-0.8 MG tablet Take 1 tablet by mouth Daily. 30 tablet 9 2021 at 0930     Allergies:  Patient has no known allergies.    Objective     Vital Signs  Temp:  [98 °F (36.7 °C)-98.5 °F (36.9 °C)] 98.1 °F (36.7 °C)  Heart Rate:  [] 89  Resp:  [16-18] 16  BP: (101-125)/(57-62) 101/59    Physical Exam:      General Appearance:    Alert, cooperative, in no acute distress   Head:    Normocephalic, without obvious abnormality, atraumatic   Eyes:             Lids and lashes normal, conjunctivae and sclerae normal, no   icterus, no pallor, corneas clear, PERRLA   Ears:    Ears appear intact with no abnormalities noted   Throat:   No oral lesions, no thrush, oral mucosa moist   Neck:   No adenopathy, supple, trachea midline, no thyromegaly, no     carotid bruit, no JVD   Back:     No kyphosis present, no scoliosis present, no skin lesions,       erythema or scars, no tenderness to percussion or                   palpation,   range of motion normal   Lungs:     Clear to auscultation,respirations regular, even and                   unlabored    Heart:    Regular rhythm and normal rate, normal S1 and S2, no            murmur, no gallop, no rub, no click   Breast Exam:    Deferred   Abdomen:     Normal bowel sounds, no masses, no organomegaly, soft        non-tender, non-distended, no guarding, no rebound                 tenderness   Genitalia:   Cervix is 2/50 and -2   Extremities:   Moves all extremities well, no edema, no cyanosis, no              redness   Pulses:   Pulses palpable and equal bilaterally   Skin:   No bleeding, bruising or rash   Lymph nodes:   No palpable adenopathy           Results Review:    I reviewed the patient's new clinical results.    Assessment/Plan        labor    Pregnancy  We will continue the steroids until the morning the benefit of  steroids.  At that point we will stop the magnesium and start her on Procardia XL if she is stable.    I discussed the patient's findings and my recommendations with patient and family.     Griffin Matthews DO  22  11:17 EST        Electronically signed by Griffin Matthews DO at 22 1118     H&P signed by New Onbase, [place] at 21 0836      Scan on 2021 by [employer], [place]: H&P, [facility], 2021          Electronically signed by New Onbase, Eastern at 21 0836       Vital Signs (last 3 days) before discharge     Date/Time Temp Temp src Pulse  Resp BP Patient Position SpO2    01/01/22 1304 -- -- 71 -- 99/56 -- --    01/01/22 1234 -- -- 67 -- 100/58 -- --    01/01/22 0749 98.1 (36.7) Oral 89 16 101/59 Lying --    01/01/22 0234 -- -- 101 -- 105/57 -- --    12/31/21 2307 -- -- 92 -- 111/60 -- --    12/31/21 2000 98.5 (36.9) Oral 90 18 115/60 Lying --    12/31/21 1646 -- -- 73 -- 112/57 -- --    12/31/21 1309 -- -- 82 -- 125/61 -- --    12/31/21 1230 98 (36.7) Oral 80 18 113/62 -- --    12/31/21 1226 -- -- 87 -- -- -- 99    12/31/21 0937 -- -- 93 -- 102/56 -- --    12/31/21 0819 -- -- 81 -- 99/56 -- --    12/31/21 0747 98 (36.7) Oral 71 16 113/64 -- --    12/31/21 0717 -- -- 69 -- 110/62 -- --    12/31/21 0517 -- -- 66 -- 107/60 -- --    12/31/21 0449 -- -- 77 -- -- -- 98    12/31/21 0447 -- -- 75 -- 107/61 -- --    12/31/21 0424 -- -- 77 -- -- -- 99    12/31/21 0417 -- -- 86 -- 115/66 -- --    12/31/21 0349 -- -- 63 -- -- -- 99    12/31/21 0347 -- -- 70 -- 111/66 -- --    12/31/21 0344 -- -- -- -- -- -- 97    12/31/21 0316 -- -- 62 -- 104/60 -- --    12/31/21 0248 -- -- 69 -- 100/54 -- --    12/31/21 0217 -- -- 75 -- 100/54 -- --    12/31/21 0214 -- -- 76 -- -- -- 93    12/31/21 0153 -- -- -- -- -- -- --    12/31/21 0148 -- -- 67 -- 99/55 -- --    12/31/21 0117 -- -- 82 -- 110/66 -- --    12/31/21 0047 -- -- 67 -- 106/56 -- --    12/31/21 0017 -- -- 76 -- 87/48 -- 95    12/30/21 2352 -- -- 71 -- 90/58 -- 96    12/30/21 2347 -- -- 66 -- 83/50 -- 95    12/30/21 2317 -- -- 63 -- 100/61 -- 99    12/30/21 2300 -- -- -- -- -- -- --    12/30/21 2247 -- -- 65 -- 103/56 -- 95    12/30/21 2217 -- -- -- -- -- -- 95    12/30/21 2216 -- -- 64 -- 97/56 -- --    12/30/21 2211 -- -- -- -- -- -- 94    12/30/21 2210 -- -- 66 -- 97/52 -- --    12/30/21 2206 -- -- 69 -- 100/56 -- 91    12/30/21 2200 -- -- 71 -- 96/51 -- --    12/30/21 2156 -- -- 70 -- 108/55 -- 94    12/30/21 2152 -- -- -- -- -- -- 93    12/30/21 2151 -- -- 77 -- 99/58 -- --    12/30/21 2146 -- -- 73 -- 103/57  -- 93    12/30/21 2142 -- -- -- -- -- -- 95    12/30/21 2141 -- -- 79 -- 100/56 -- --    12/30/21 2137 -- -- 80 -- 107/55 -- 93    12/30/21 2130 -- -- 75 -- 110/63 -- --    12/30/21 2126 -- -- 74 -- 107/63 -- --    12/30/21 2120 -- -- 71 -- 102/61 -- --    12/30/21 2117 -- -- -- -- -- -- 99    12/30/21 2116 -- -- 75 -- 100/61 -- 99    12/30/21 2111 -- -- 77 -- 105/58 -- 99    12/30/21 2105 -- -- 86 -- 110/65 -- --    12/30/21 2100 -- -- 86 -- 107/64 -- 97    12/30/21 2056 -- -- 78 -- 107/61 -- --    12/30/21 2050 -- -- 86 -- 108/70 -- 98    12/30/21 2046 -- -- 78 -- -- -- 100    12/30/21 2045 -- -- 80 -- 117/67 -- 100    12/30/21 2001 98.1 (36.7) Oral 100 20 116/68 Lying 99    Comment rows:   OBSERV: entered room pt almost in tears states she is hurting so bad, pressure will recheck and give pain medication at 12/31/21 0153   OBSERV: Dr. White called and was informed pt is rylee every 4-5 mins. Per Dr. White will turn magnesium sulfate down from 3 to 2.5 in 1 hour from now. at 12/30/21 2300         Intake & Output (last 3 days)       12/31 0701 01/01 0700 01/01 0701 01/02 0700 01/02 0701 01/03 0700 01/03 0701 01/04 0700    P.O. 2040       I.V. (mL/kg) 3600 (51.9)       Total Intake(mL/kg) 5640 (81.3)       Urine (mL/kg/hr) 3900 (2.3) 700 (0.4)      Total Output 3900 700      Net +1740 -700                     Medication Administration Report for Dulce Boateng as of 01/03/22 0940   Medications 12/30/21 12/31/21 01/01/22   Completed Medications    betamethasone acetate-betamethasone sodium phosphate (CELESTONE SOLUSPAN) injection 12 mg  Dose: 12 mg  Freq: Once Route: IM  Start: 12/30/21 2130   End: 12/30/21 2117   Admin Instructions:   For IM injection only       2117-Given             magnesium sulfate bolus from bag 0.04 g/mL 4 g  Dose: 4 g  Freq: Once Route: IV  Start: 12/30/21 2215   End: 12/30/21 2120   Admin Instructions:   Bolus magnesium over 20-30 minutes from 20 g/500mL bag.       2050-Bolus from  Bag            Discontinued Medications  Medications 12/30/21 12/31/21 01/01/22       folic acid (FOLVITE) tablet 1,000 mcg  Dose: 1,000 mcg  Freq: Daily Route: PO  Start: 12/31/21 0900   End: 01/01/22 1602        0937-Given           1110-Given [C]           NIFEdipine CC (ADALAT CC) 24 hr tablet 30 mg  Dose: 30 mg  Freq: Every 24 Hours Scheduled Route: PO  Start: 01/01/22 1200   End: 01/01/22 1602   Admin Instructions:   Swallow whole. Do not crush, split or chew.  Caution: Look alike/sound alike drug alert.  Avoid grapefruit juice. Swallow whole; do not crush, split, or chew.         1201-Given           prenatal vitamin tablet 1 tablet  Dose: 1 tablet  Freq: Daily Route: PO  Start: 12/31/21 0900   End: 01/01/22 1602        0937-Given           1110-Given [C]                 and   Medication Administration Report for Dulce Boateng as of 01/03/22 0940   Medications 12/30/21 12/31/21 01/01/22   Discontinued Medications    lactated ringers infusion  Rate: 75 mL/hr Dose: 75 mL/hr  Freq: Continuous Route: IV  Start: 12/30/21 2215   End: 01/01/22 1602 2040-New Bag       2120-Rate/Dose Change       2139-Rate/Dose Change           0000-Rate/Dose Change       1223-New Bag       2308-New Bag           0507-New Bag           magnesium sulfate 20 GM/500ML infusion  Rate: 50 mL/hr Dose: 2 g/hr  Freq: Continuous Route: IV  Indications of Use: PREECLAMPSIA  Start: 12/30/21 2215   End: 01/01/22 1602 2121-New Bag       2122-Rate/Dose Change       2139-Rate/Dose Change       2359-Rate/Dose Change           0325-New Bag       1051-Rate/Dose Change       1200-Rate/Dose Verify       1412-New Bag       2308-New Bag                   Lab Results (all)     Procedure Component Value Units Date/Time    Magnesium [[id number]]  (Abnormal) Collected: 12/31/21 0038    Specimen: Blood Updated: 12/31/21 0117     Magnesium 5.7 mg/dL     COVID PRE-OP / PRE-PROCEDURE SCREENING ORDER (NO ISOLATION) - Swab, Nasopharynx [[id number]]   (Normal) Collected: 12/30/21 2232    Specimen: Swab from Nasopharynx Updated: 12/30/21 2322    Narrative:      The following orders were created for panel order COVID PRE-OP / PRE-PROCEDURE SCREENING ORDER (NO ISOLATION) - Swab, Nasopharynx.  Procedure                               Abnormality         Status                     ---------                               -----------         ------                     COVID-19,CEPHEID/JOAN,CO...[[id number]]  Normal              Final result                 Please view results for these tests on the individual orders.    COVID-19,CEPHEID/JOAN,COR/SHARYN/PAD/JAG IN-HOUSE(OR EMERGENT/ADD-ON),NP SWAB IN TRANSPORT MEDIA 3-4 HR TAT, RT-PCR - Swab, Nasopharynx [[id number]]  (Normal) Collected: 12/30/21 2232    Specimen: Swab from Nasopharynx Updated: 12/30/21 2322     COVID19 Not Detected    Urinalysis With Culture If Indicated - Urine, Catheter In/Out [[id number]]  (Abnormal) Collected: 12/30/21 2021    Specimen: Urine, Catheter In/Out Updated: 12/30/21 2046     Color, UA Yellow     Appearance, UA Turbid     pH, UA 7.5     Specific Gravity, UA 1.016     Glucose, UA Negative     Ketones, UA Negative     Bilirubin, UA Negative     Blood, UA Negative     Protein, UA Negative     Leuk Esterase, UA Negative     Nitrite, UA Negative     Urobilinogen, UA 1.0 E.U./dL    Narrative:      Urine microscopic not indicated.    CBC (No Diff) [[id number]]  (Abnormal) Collected: 12/30/21 2021    Specimen: Blood Updated: 12/30/21 2043     WBC 10.87 10*3/mm3      RBC 3.84 10*6/mm3      Hemoglobin 11.2 g/dL      Hematocrit 35.3 %      MCV 91.9 fL      MCH 29.2 pg      MCHC 31.7 g/dL      RDW 13.1 %      RDW-SD 43.1 fl      MPV 11.5 fL      Platelets 188 10*3/mm3           Imaging Results (All)     None        Orders (all)      Start     Ordered    01/01/22 1247  Discharge patient  Once         01/01/22 1247    01/01/22 1200  NIFEdipine CC (ADALAT CC) 24 hr tablet 30 mg  Every 24 Hours Scheduled,    "Status:  Discontinued         22 1051    22  Admit To Obstetrics Inpatient  Once        Comments:  @ 1900    22 0229    21 140  Initiate Observation Status  Once        Comments: Order clarification: status observation beginning 21 1406    21 1053  Diet Regular  Diet Effective Now,   Status:  Canceled         21 1052    21  Continuous Pulse Oximetry  Continuous,   Status:  Canceled         21  Check Clonus (DTRs) With Vitals  Per Hospital Policy,   Status:  Canceled         21  Assess LOC With Vitals  Per Hospital Policy,   Status:  Canceled         21  Assess Lung Sounds  Per Hospital Policy,   Status:  Canceled         21                   Physician Progress Notes (all)      Griffin Matthews DO at 22 1118          Subjective    The patient has no complaints.  Only having a rare mild contraction.  She has good fetal movement no vaginal bleeding or loss of fluid.             Objective    Objective /59 (BP Location: Left arm, Patient Position: Lying)   Pulse 89   Temp 98.1 °F (36.7 °C) (Oral)   Resp 16   Ht 162.6 cm (64.02\")   Wt 69.4 kg (153 lb)   SpO2 99%   Breastfeeding Yes   BMI 26.25 kg/m²   General:  alert, appears stated age and cooperative   Abdomen: soft, bowel sounds active, non-tender   Extremities:   No cyanosis clubbing or edema        Assessment and plan   labor-much improved.  We are going to discontinue her magnesium and start her on Procardia XL today.  We will observe her through the day and if she does well we will discharge her home this afternoon.  She has finished her course of  steroids.    Electronically signed by Griffin Matthews DO at 22 1120         FHR (since admission)     Date/Time Fetal HR Assessment Method Fetal HR (beats/min) Fetal Heart Baseline Rate Fetal HR " Variability Fetal HR Accelerations Fetal HR Decelerations Fetal HR Tracing Category    01/01/22 1200 external 125 normal range moderate (amplitude range 6 to 25 bpm) greater than/equal to 15 bpm; lasting at least 15 seconds absent --    01/01/22 1100 external 130 normal range moderate (amplitude range 6 to 25 bpm) greater than/equal to 15 bpm; lasting at least 15 seconds absent --    01/01/22 1000 external 120 normal range moderate (amplitude range 6 to 25 bpm) greater than/equal to 15 bpm; lasting at least 15 seconds absent --    01/01/22 0920 external 125 normal range moderate (amplitude range 6 to 25 bpm) greater than/equal to 15 bpm; lasting at least 15 seconds absent --    01/01/22 0800 external 125 normal range moderate (amplitude range 6 to 25 bpm) greater than/equal to 15 bpm; lasting at least 15 seconds absent --    01/01/22 0700 external 115 normal range moderate (amplitude range 6 to 25 bpm) greater than/equal to 15 bpm; lasting at least 15 seconds absent --    01/01/22 0600 external 120 normal range moderate (amplitude range 6 to 25 bpm) greater than/equal to 15 bpm; lasting at least 15 seconds absent --    01/01/22 0500 external 120 normal range moderate (amplitude range 6 to 25 bpm) greater than/equal to 15 bpm; lasting at least 15 seconds absent --    01/01/22 0400 external 120 normal range moderate (amplitude range 6 to 25 bpm) greater than/equal to 15 bpm; lasting at least 15 seconds absent --    01/01/22 0300 external 115 normal range moderate (amplitude range 6 to 25 bpm) greater than/equal to 15 bpm; lasting at least 15 seconds absent --    01/01/22 0200 external 120 normal range moderate (amplitude range 6 to 25 bpm) greater than/equal to 15 bpm; lasting at least 15 seconds absent --    01/01/22 0100 external 120 normal range minimal (detectable, amplitude less than or equal to 5 bpm) greater than/equal to 15 bpm; lasting at least 15 seconds absent --    01/01/22 0000 external 125 normal range  moderate (amplitude range 6 to 25 bpm) greater than/equal to 15 bpm; lasting at least 15 seconds variable --    12/31/21 2330 external 125 normal range moderate (amplitude range 6 to 25 bpm) greater than/equal to 15 bpm; lasting at least 15 seconds absent --    12/31/21 2300 external 130 normal range minimal (detectable, amplitude less than or equal to 5 bpm) absent absent --    12/31/21 2230 external 125 normal range moderate (amplitude range 6 to 25 bpm) lasting at least 15 seconds; greater than/equal to 15 bpm absent --    12/31/21 2200 external 125 normal range moderate (amplitude range 6 to 25 bpm) lasting at least 15 seconds; greater than/equal to 15 bpm absent --    12/31/21 2130 external 120 normal range moderate (amplitude range 6 to 25 bpm) lasting at least 15 seconds; greater than/equal to 15 bpm absent --    12/31/21 2100 external 125 normal range moderate (amplitude range 6 to 25 bpm) lasting at least 15 seconds; greater than/equal to 15 bpm absent --    12/31/21 2030 external 125 normal range moderate (amplitude range 6 to 25 bpm) lasting at least 15 seconds; greater than/equal to 15 bpm absent --    12/31/21 2000 external 130 normal range moderate (amplitude range 6 to 25 bpm) lasting at least 15 seconds; greater than/equal to 15 bpm absent --    12/31/21 1930 external 130 normal range moderate (amplitude range 6 to 25 bpm) lasting at least 15 seconds; greater than/equal to 15 bpm absent --    12/31/21 1900 external 130 normal range moderate (amplitude range 6 to 25 bpm) lasting at least 15 seconds; greater than/equal to 15 bpm absent --    12/31/21 1800 fetal spiral electrode 120 normal range moderate (amplitude range 6 to 25 bpm) lasting at least 15 seconds absent --    12/31/21 1700 fetal spiral electrode -- normal range moderate (amplitude range 6 to 25 bpm) lasting at least 15 seconds variable  X1 --    12/31/21 1600 external 115 normal range moderate (amplitude range 6 to 25 bpm) lasting at  least 15 seconds absent --    12/31/21 1500 external 115 normal range moderate (amplitude range 6 to 25 bpm) lasting at least 15 seconds absent --    12/31/21 1400 external 120 normal range moderate (amplitude range 6 to 25 bpm) lasting at least 15 seconds absent --    12/31/21 1200 external 120 normal range moderate (amplitude range 6 to 25 bpm) lasting at least 15 seconds absent --    12/31/21 1100 external 115 normal range moderate (amplitude range 6 to 25 bpm) lasting at least 15 seconds absent --    12/31/21 1000 external 120 normal range moderate (amplitude range 6 to 25 bpm) lasting at least 15 seconds absent --    12/31/21 0900 external 120 normal range moderate (amplitude range 6 to 25 bpm) lasting at least 15 seconds absent --    12/31/21 0800 external 120 normal range moderate (amplitude range 6 to 25 bpm) lasting at least 15 seconds; greater than/equal to 15 bpm absent --    12/31/21 0700 external 110 normal range moderate (amplitude range 6 to 25 bpm) greater than/equal to 15 bpm; lasting at least 15 seconds absent --    12/31/21 0630 external 110 normal range moderate (amplitude range 6 to 25 bpm) greater than/equal to 15 bpm; lasting at least 15 seconds absent --    12/31/21 0600 external 105 normal range moderate (amplitude range 6 to 25 bpm) greater than/equal to 15 bpm; lasting at least 15 seconds absent --    12/31/21 0530 external 100 normal range moderate (amplitude range 6 to 25 bpm) greater than/equal to 15 bpm; lasting at least 15 seconds absent --    12/31/21 0500 external 110 normal range moderate (amplitude range 6 to 25 bpm) greater than/equal to 15 bpm; lasting at least 15 seconds absent --    12/31/21 0430 external 105 bradycardia moderate (amplitude range 6 to 25 bpm) greater than/equal to 15 bpm; lasting at least 15 seconds absent --    12/31/21 0400 external 100 bradycardia moderate (amplitude range 6 to 25 bpm) absent variable --    12/31/21 0330 external 100 bradycardia moderate  (amplitude range 6 to 25 bpm) greater than/equal to 15 bpm; lasting at least 15 seconds absent --    12/31/21 0300 external 100 bradycardia moderate (amplitude range 6 to 25 bpm) greater than/equal to 15 bpm; lasting at least 15 seconds absent --    12/31/21 0230 external 105 bradycardia moderate (amplitude range 6 to 25 bpm) greater than/equal to 15 bpm; lasting at least 15 seconds absent --    12/31/21 0200 external 115 normal range moderate (amplitude range 6 to 25 bpm) greater than/equal to 15 bpm; lasting at least 15 seconds absent --    12/31/21 0130 external 110 normal range moderate (amplitude range 6 to 25 bpm) greater than/equal to 15 bpm; lasting at least 15 seconds absent --    12/31/21 0100 external 115 normal range moderate (amplitude range 6 to 25 bpm) greater than/equal to 15 bpm; lasting at least 15 seconds absent --    12/31/21 0030 external 110 normal range moderate (amplitude range 6 to 25 bpm) greater than/equal to 15 bpm; lasting at least 15 seconds absent --    12/31/21 0000 external 110 normal range moderate (amplitude range 6 to 25 bpm) greater than/equal to 15 bpm; lasting at least 15 seconds absent --    12/30/21 2330 external 110 normal range moderate (amplitude range 6 to 25 bpm) greater than/equal to 15 bpm; lasting at least 15 seconds absent --    12/30/21 2300 external 110 normal range moderate (amplitude range 6 to 25 bpm) greater than/equal to 15 bpm; lasting at least 15 seconds absent --    12/30/21 2230 external 110 normal range moderate (amplitude range 6 to 25 bpm) greater than/equal to 15 bpm; lasting at least 15 seconds absent --    12/30/21 2200 external 120 normal range moderate (amplitude range 6 to 25 bpm) greater than/equal to 15 bpm; lasting at least 15 seconds absent --    12/30/21 2130 external 155 normal range moderate (amplitude range 6 to 25 bpm) greater than/equal to 15 bpm; lasting at least 15 seconds absent --    12/30/21 2100 external 155 normal range moderate  (amplitude range 6 to 25 bpm) greater than/equal to 15 bpm; lasting at least 15 seconds variable; late --    12/30/21 2030 external 155 normal range moderate (amplitude range 6 to 25 bpm) greater than/equal to 15 bpm; lasting at least 15 seconds absent --        Uterine Activity (since admission)     Date/Time Method Contraction Frequency (Minutes) Contraction Duration (sec) Contraction Intensity Uterine Resting Tone Contraction Pattern    01/01/22 1200 palpation; external tocotransducer once 60 mild by palpation soft by palpation --    01/01/22 1100 palpation; external tocotransducer -- 90 mild by palpation soft by palpation --    01/01/22 1000 palpation; external tocotransducer once 80 mild by palpation soft by palpation --    01/01/22 0920 -- 1 -- -- -- --    01/01/22 0800 palpation; external tocotransducer -- -- no contractions soft by palpation --    01/01/22 0700 palpation; external tocotransducer 2  per hour 70-80 mild by palpation soft by palpation --    01/01/22 0600 palpation; external tocotransducer -- 60 mild by palpation soft by palpation --    01/01/22 0500 palpation; external tocotransducer -- -- mild by palpation soft by palpation --    01/01/22 0400 palpation; external tocotransducer -- -- mild by palpation soft by palpation --    01/01/22 0300 palpation; external tocotransducer -- -- mild by palpation soft by palpation --    01/01/22 0200 palpation; external tocotransducer -- -- mild by palpation soft by palpation --    01/01/22 0100 palpation; external tocotransducer -- -- mild by palpation soft by palpation --    01/01/22 0000 palpation; external tocotransducer -- -- mild by palpation soft by palpation --    12/31/21 2330 palpation; external tocotransducer -- -- mild by palpation soft by palpation --    12/31/21 2300 palpation; external tocotransducer -- -- mild by palpation soft by palpation --    12/31/21 2230 palpation; external tocotransducer -- -- mild by palpation soft by palpation --     12/31/21 2200 palpation; external tocotransducer -- -- mild by palpation soft by palpation --    12/31/21 2130 palpation; external tocotransducer -- -- no contractions soft by palpation --    12/31/21 2100 palpation; external tocotransducer -- -- mild by palpation soft by palpation --    12/31/21 2030 palpation; external tocotransducer -- -- no contractions soft by palpation --    12/31/21 2000 palpation; external tocotransducer 1 -- mild by palpation soft by palpation --    12/31/21 1930 palpation; external tocotransducer -- -- no contractions soft by palpation --    12/31/21 1900 palpation; external tocotransducer -- -- no contractions soft by palpation --    12/31/21 1800 palpation; external tocotransducer 1  PER HR -- mild by palpation soft by palpation --    12/31/21 1700 palpation; external tocotransducer 3  PER HR -- mild by palpation soft by palpation --    12/31/21 1600 palpation; per patient report; external tocotransducer 3  PER HR -- mild by palpation soft by palpation --    12/31/21 1500 palpation; per patient report; external tocotransducer 2  per hr -- mild by palpation soft by palpation --    12/31/21 1400 palpation; per patient report; external tocotransducer 2  per hr -- mild by palpation soft by palpation --    12/31/21 1200 palpation; per patient report; external tocotransducer 4 -- mild by palpation soft by palpation --    12/31/21 1100 palpation; per patient report; external tocotransducer 4-5 -- mild by palpation soft by palpation --    12/31/21 1000 palpation; per patient report; external tocotransducer 4-5 -- mild by palpation soft by palpation --    12/31/21 0900 palpation; per patient report; external tocotransducer 4-5  per hr -- mild by palpation soft by palpation --    12/31/21 0800 palpation; per patient report; external tocotransducer 4-5  per hr -- mild by palpation soft by palpation --    12/31/21 0700 external tocotransducer; palpation 4-5 40-60 mild by palpation soft by palpation  --    12/31/21 0630 external tocotransducer; palpation 4-5 40-60 mild by palpation soft by palpation --    12/31/21 0600 external tocotransducer; palpation 7-10 70-80 mild by palpation soft by palpation --    12/31/21 0530 external tocotransducer 10-11  mild by palpation soft by palpation --    12/31/21 0500 external tocotransducer 3-9  mild by palpation soft by palpation --    12/31/21 0430 external tocotransducer 6-7 70-80 mild by palpation soft by palpation --    12/31/21 0400 external tocotransducer 5-6 60-80 mild by palpation soft by palpation --    12/31/21 0330 external tocotransducer --  laying on side had to adjust montior 80 mild by palpation soft by palpation --    12/31/21 0300 external tocotransducer 5-7  mild by palpation soft by palpation --    12/31/21 0230 per patient report; palpation; external tocotransducer 5-7 60-80 mild by palpation soft by palpation --    12/31/21 0200 per patient report; palpation; external tocotransducer 4-7  mild by palpation soft by palpation --    12/31/21 0130 external tocotransducer 2-7 40-80 mild by palpation soft by palpation --    12/31/21 0100 external tocotransducer 4-6 50-80 mild by palpation soft by palpation --    12/31/21 0030 external tocotransducer 3-6 50-70 mild by palpation soft by palpation --    12/31/21 0000 palpation; per patient report; external tocotransducer 2-5 50-90 mild by palpation soft by palpation --    12/30/21 2330 external tocotransducer; per patient report 3-7 50-90 mild by palpation soft by palpation --    12/30/21 2300 external tocotransducer; per patient report 5-7  mild by palpation soft by palpation --    12/30/21 2230 external tocotransducer; per patient report 8-9  mild by palpation soft by palpation --    12/30/21 2200 external tocotransducer 2-4 60-90 mild by palpation soft by palpation --    12/30/21 5725 palpation; external tocotransducer; per patient report 4-5  mild by palpation soft by  palpation --    21 2100 per patient report; palpation 2-5 60-70 mild by palpation soft by palpation --    21 external tocotransducer; per patient report; palpation 2-4  mild by palpation soft by palpation --           Discharge Summary      Griffin Matthews DO at 22 1120            Date of Discharge: 22     Discharge Diagnosis:   33-week intrauterine pregnancy   labor    Problem List:     labor    Pregnancy      Presenting Problem/History of Present Illness  Pregnancy [Z34.90]   labor [O60.00]      Hospital Course  Patient is a 26 y.o. female presented with contractions.  She required magnesium to get her labor to stop.  She was given betamethasone 12 mg for 2 doses.  Her contractions did resolve and she was observed over 2 nights.  We started her on Procardia XL she will she will continue as an outpatient.  She will follow-up next week in the office.  Procedures Performed         Consults:   Consults     No orders found from 2021 to 2021.          Pertinent Test Results:    Condition on Discharge: Stable  Vital Signs  Temp:  [98 °F (36.7 °C)-98.5 °F (36.9 °C)] 98.1 °F (36.7 °C)  Heart Rate:  [] 89  Resp:  [16-18] 16  BP: (101-125)/(57-62) 101/59    Discharge Disposition      Discharge Medications     Discharge Medications      ASK your doctor about these medications      Instructions Start Date   folic acid 1 MG tablet  Commonly known as: FOLVITE   1,000 mcg, Oral, Daily      HYDROXYprogesterone caproate 250 MG/ML IM injection  Commonly known as: RAQUEL   250 mg, Intramuscular, Every 7 Days, PTA: takes on        prenatal vitamin 27-0.8 27-0.8 MG tablet tablet   1 tablet, Oral, Daily             Discharge Diet       Activity at Discharge      Follow-up Appointments  Future Appointments   Date Time Provider Department Center   2022  1:10 PM Ron Murillo MD [facility]  [facility]           Time: Discharge 15  min    Electronically signed by Griffin Matthesw DO at 01/01/22 1124

## 2022-01-07 ENCOUNTER — ANESTHESIA (OUTPATIENT)
Dept: LABOR AND DELIVERY | Facility: HOSPITAL | Age: 27
End: 2022-01-07

## 2022-01-07 ENCOUNTER — HOSPITAL ENCOUNTER (INPATIENT)
Facility: HOSPITAL | Age: 27
LOS: 4 days | Discharge: HOME | End: 2022-01-11
Attending: OBSTETRICS & GYNECOLOGY | Admitting: OBSTETRICS & GYNECOLOGY

## 2022-01-07 ENCOUNTER — ANESTHESIA EVENT (OUTPATIENT)
Dept: LABOR AND DELIVERY | Facility: HOSPITAL | Age: 27
End: 2022-01-07

## 2022-01-07 LAB
A1 MICROGLOB PLACENTAL VAG QL: POSITIVE
ABO GROUP BLD: NORMAL
AMPHET+METHAMPHET UR QL: NEGATIVE
AMPHETAMINES UR QL: NEGATIVE
BARBITURATES UR QL SCN: NEGATIVE
BASOPHILS # BLD AUTO: 0.05 10*3/MM3 (ref 0–0.2)
BASOPHILS NFR BLD AUTO: 0.3 % (ref 0–1.5)
BENZODIAZ UR QL SCN: NEGATIVE
BLD GP AB SCN SERPL QL: NEGATIVE
BUPRENORPHINE SERPL-MCNC: NEGATIVE NG/ML
CANNABINOIDS SERPL QL: NEGATIVE
COCAINE UR QL: NEGATIVE
DEPRECATED RDW RBC AUTO: 44.2 FL (ref 37–54)
EOSINOPHIL # BLD AUTO: 0.03 10*3/MM3 (ref 0–0.4)
EOSINOPHIL NFR BLD AUTO: 0.2 % (ref 0.3–6.2)
ERYTHROCYTE [DISTWIDTH] IN BLOOD BY AUTOMATED COUNT: 13.2 % (ref 12.3–15.4)
EXTERNAL GROUP B STREP ANTIGEN: NORMAL
HCT VFR BLD AUTO: 36.9 % (ref 34–46.6)
HGB BLD-MCNC: 11.8 G/DL (ref 12–15.9)
IMM GRANULOCYTES # BLD AUTO: 0.66 10*3/MM3 (ref 0–0.05)
IMM GRANULOCYTES NFR BLD AUTO: 3.8 % (ref 0–0.5)
LYMPHOCYTES # BLD AUTO: 2.18 10*3/MM3 (ref 0.7–3.1)
LYMPHOCYTES NFR BLD AUTO: 12.7 % (ref 19.6–45.3)
MCH RBC QN AUTO: 29.3 PG (ref 26.6–33)
MCHC RBC AUTO-ENTMCNC: 32 G/DL (ref 31.5–35.7)
MCV RBC AUTO: 91.6 FL (ref 79–97)
METHADONE UR QL SCN: NEGATIVE
MONOCYTES # BLD AUTO: 0.88 10*3/MM3 (ref 0.1–0.9)
MONOCYTES NFR BLD AUTO: 5.1 % (ref 5–12)
NEUTROPHILS NFR BLD AUTO: 13.42 10*3/MM3 (ref 1.7–7)
NEUTROPHILS NFR BLD AUTO: 77.9 % (ref 42.7–76)
NRBC BLD AUTO-RTO: 0 /100 WBC (ref 0–0.2)
OPIATES UR QL: NEGATIVE
OXYCODONE UR QL SCN: NEGATIVE
PCP UR QL SCN: NEGATIVE
PLATELET # BLD AUTO: 193 10*3/MM3 (ref 140–450)
PMV BLD AUTO: 11.5 FL (ref 6–12)
PROPOXYPH UR QL: NEGATIVE
RBC # BLD AUTO: 4.03 10*6/MM3 (ref 3.77–5.28)
RH BLD: POSITIVE
SARS-COV-2 RNA PNL SPEC NAA+PROBE: NOT DETECTED
T&S EXPIRATION DATE: NORMAL
TRICYCLICS UR QL SCN: NEGATIVE
WBC NRBC COR # BLD: 17.22 10*3/MM3 (ref 3.4–10.8)

## 2022-01-07 PROCEDURE — C1755 CATHETER, INTRASPINAL: HCPCS | Performed by: NURSE ANESTHETIST, CERTIFIED REGISTERED

## 2022-01-07 PROCEDURE — 86900 BLOOD TYPING SEROLOGIC ABO: CPT | Performed by: OBSTETRICS & GYNECOLOGY

## 2022-01-07 PROCEDURE — 25010000002 FENTANYL CITRATE (PF) 50 MCG/ML SOLUTION: Performed by: NURSE ANESTHETIST, CERTIFIED REGISTERED

## 2022-01-07 PROCEDURE — 0 LIDOCAINE 1 % SOLUTION: Performed by: NURSE ANESTHETIST, CERTIFIED REGISTERED

## 2022-01-07 PROCEDURE — 86901 BLOOD TYPING SEROLOGIC RH(D): CPT | Performed by: OBSTETRICS & GYNECOLOGY

## 2022-01-07 PROCEDURE — 86850 RBC ANTIBODY SCREEN: CPT | Performed by: OBSTETRICS & GYNECOLOGY

## 2022-01-07 PROCEDURE — 84112 EVAL AMNIOTIC FLUID PROTEIN: CPT | Performed by: OBSTETRICS & GYNECOLOGY

## 2022-01-07 PROCEDURE — 85025 COMPLETE CBC W/AUTO DIFF WBC: CPT | Performed by: OBSTETRICS & GYNECOLOGY

## 2022-01-07 PROCEDURE — 80306 DRUG TEST PRSMV INSTRMNT: CPT | Performed by: OBSTETRICS & GYNECOLOGY

## 2022-01-07 PROCEDURE — 59025 FETAL NON-STRESS TEST: CPT

## 2022-01-07 PROCEDURE — 25010000002 AMPICILLIN PER 500 MG: Performed by: OBSTETRICS & GYNECOLOGY

## 2022-01-07 PROCEDURE — 87635 SARS-COV-2 COVID-19 AMP PRB: CPT | Performed by: OBSTETRICS & GYNECOLOGY

## 2022-01-07 RX ORDER — NIFEDIPINE 30 MG/1
30 TABLET, FILM COATED, EXTENDED RELEASE ORAL
Status: DISCONTINUED | OUTPATIENT
Start: 2022-01-08 | End: 2022-01-08

## 2022-01-07 RX ORDER — ACETAMINOPHEN 325 MG/1
650 TABLET ORAL EVERY 4 HOURS PRN
Status: DISCONTINUED | OUTPATIENT
Start: 2022-01-07 | End: 2022-01-08 | Stop reason: HOSPADM

## 2022-01-07 RX ORDER — SODIUM CHLORIDE 0.9 % (FLUSH) 0.9 %
3-10 SYRINGE (ML) INJECTION AS NEEDED
Status: DISCONTINUED | OUTPATIENT
Start: 2022-01-07 | End: 2022-01-08 | Stop reason: HOSPADM

## 2022-01-07 RX ORDER — OXYTOCIN-SODIUM CHLORIDE 0.9% IV SOLN 30 UNIT/500ML 30-0.9/5 UT/ML-%
2 SOLUTION INTRAVENOUS
Status: DISCONTINUED | OUTPATIENT
Start: 2022-01-07 | End: 2022-01-08 | Stop reason: HOSPADM

## 2022-01-07 RX ORDER — LIDOCAINE HYDROCHLORIDE 20 MG/ML
INJECTION, SOLUTION EPIDURAL; INFILTRATION; INTRACAUDAL; PERINEURAL AS NEEDED
Status: DISCONTINUED | OUTPATIENT
Start: 2022-01-07 | End: 2022-01-08 | Stop reason: SURG

## 2022-01-07 RX ORDER — FAMOTIDINE 10 MG/ML
20 INJECTION, SOLUTION INTRAVENOUS ONCE AS NEEDED
Status: DISCONTINUED | OUTPATIENT
Start: 2022-01-07 | End: 2022-01-08 | Stop reason: HOSPADM

## 2022-01-07 RX ORDER — PRENATAL VIT/IRON FUM/FOLIC AC 27MG-0.8MG
1 TABLET ORAL DAILY
Status: DISCONTINUED | OUTPATIENT
Start: 2022-01-08 | End: 2022-01-08

## 2022-01-07 RX ORDER — FENTANYL CITRATE 50 UG/ML
INJECTION, SOLUTION INTRAMUSCULAR; INTRAVENOUS
Status: COMPLETED
Start: 2022-01-07 | End: 2022-01-07

## 2022-01-07 RX ORDER — EPHEDRINE SULFATE 50 MG/ML
10 INJECTION, SOLUTION INTRAVENOUS
Status: DISCONTINUED | OUTPATIENT
Start: 2022-01-07 | End: 2022-01-08 | Stop reason: HOSPADM

## 2022-01-07 RX ORDER — LIDOCAINE HYDROCHLORIDE 20 MG/ML
INJECTION, SOLUTION EPIDURAL; INFILTRATION; INTRACAUDAL; PERINEURAL
Status: COMPLETED
Start: 2022-01-07 | End: 2022-01-07

## 2022-01-07 RX ORDER — FENTANYL CIT 0.2 MG/100ML-ROPIV 0.2%-NACL 0.9% EPIDURAL INJ 2/0.2 MCG/ML-%
SOLUTION INJECTION
Status: COMPLETED
Start: 2022-01-07 | End: 2022-01-08

## 2022-01-07 RX ORDER — MAGNESIUM HYDROXIDE 1200 MG/15ML
1000 LIQUID ORAL ONCE AS NEEDED
Status: DISCONTINUED | OUTPATIENT
Start: 2022-01-07 | End: 2022-01-08 | Stop reason: HOSPADM

## 2022-01-07 RX ORDER — FENTANYL CITRATE 50 UG/ML
100 INJECTION, SOLUTION INTRAMUSCULAR; INTRAVENOUS ONCE
Status: COMPLETED | OUTPATIENT
Start: 2022-01-07 | End: 2022-01-07

## 2022-01-07 RX ORDER — FOLIC ACID 1 MG/1
1000 TABLET ORAL DAILY
Status: DISCONTINUED | OUTPATIENT
Start: 2022-01-08 | End: 2022-01-11 | Stop reason: HOSPADM

## 2022-01-07 RX ORDER — SODIUM CHLORIDE, SODIUM LACTATE, POTASSIUM CHLORIDE, CALCIUM CHLORIDE 600; 310; 30; 20 MG/100ML; MG/100ML; MG/100ML; MG/100ML
125 INJECTION, SOLUTION INTRAVENOUS CONTINUOUS
Status: DISCONTINUED | OUTPATIENT
Start: 2022-01-07 | End: 2022-01-11 | Stop reason: HOSPADM

## 2022-01-07 RX ORDER — FENTANYL CIT 0.2 MG/100ML-ROPIV 0.2%-NACL 0.9% EPIDURAL INJ 2/0.2 MCG/ML-%
14 SOLUTION INJECTION CONTINUOUS
Status: DISCONTINUED | OUTPATIENT
Start: 2022-01-07 | End: 2022-01-10

## 2022-01-07 RX ORDER — LIDOCAINE HYDROCHLORIDE 10 MG/ML
INJECTION, SOLUTION INFILTRATION; PERINEURAL AS NEEDED
Status: DISCONTINUED | OUTPATIENT
Start: 2022-01-07 | End: 2022-01-08 | Stop reason: SURG

## 2022-01-07 RX ORDER — ONDANSETRON 2 MG/ML
4 INJECTION INTRAMUSCULAR; INTRAVENOUS EVERY 6 HOURS PRN
Status: DISCONTINUED | OUTPATIENT
Start: 2022-01-07 | End: 2022-01-08 | Stop reason: HOSPADM

## 2022-01-07 RX ORDER — MINERAL OIL
OIL (ML) MISCELLANEOUS AS NEEDED
Status: DISCONTINUED | OUTPATIENT
Start: 2022-01-07 | End: 2022-01-08 | Stop reason: HOSPADM

## 2022-01-07 RX ORDER — ONDANSETRON 4 MG/1
4 TABLET, FILM COATED ORAL EVERY 6 HOURS PRN
Status: DISCONTINUED | OUTPATIENT
Start: 2022-01-07 | End: 2022-01-08 | Stop reason: HOSPADM

## 2022-01-07 RX ORDER — LIDOCAINE HYDROCHLORIDE AND EPINEPHRINE 15; 5 MG/ML; UG/ML
INJECTION, SOLUTION EPIDURAL AS NEEDED
Status: DISCONTINUED | OUTPATIENT
Start: 2022-01-07 | End: 2022-01-08 | Stop reason: SURG

## 2022-01-07 RX ADMIN — SODIUM CHLORIDE, POTASSIUM CHLORIDE, SODIUM LACTATE AND CALCIUM CHLORIDE 125 ML/HR: 600; 310; 30; 20 INJECTION, SOLUTION INTRAVENOUS at 21:38

## 2022-01-07 RX ADMIN — AMPICILLIN SODIUM 2 G: 2 INJECTION, POWDER, FOR SOLUTION INTRAVENOUS at 18:28

## 2022-01-07 RX ADMIN — FENTANYL CITRATE 100 MCG: 50 INJECTION, SOLUTION INTRAMUSCULAR; INTRAVENOUS at 20:16

## 2022-01-07 RX ADMIN — LIDOCAINE HYDROCHLORIDE 5 ML: 20 INJECTION, SOLUTION EPIDURAL; INFILTRATION; INTRACAUDAL; PERINEURAL at 20:16

## 2022-01-07 RX ADMIN — LIDOCAINE HYDROCHLORIDE AND EPINEPHRINE 3 ML: 15; 5 INJECTION, SOLUTION EPIDURAL at 20:13

## 2022-01-07 RX ADMIN — EPHEDRINE SULFATE 10 MG: 50 INJECTION INTRAVENOUS at 22:46

## 2022-01-07 RX ADMIN — Medication 14 ML/HR: at 20:19

## 2022-01-07 RX ADMIN — LIDOCAINE HYDROCHLORIDE 3 ML: 10 INJECTION, SOLUTION INFILTRATION; PERINEURAL at 20:11

## 2022-01-07 RX ADMIN — SODIUM CHLORIDE, POTASSIUM CHLORIDE, SODIUM LACTATE AND CALCIUM CHLORIDE 125 ML/HR: 600; 310; 30; 20 INJECTION, SOLUTION INTRAVENOUS at 18:00

## 2022-01-07 RX ADMIN — SODIUM CHLORIDE, POTASSIUM CHLORIDE, SODIUM LACTATE AND CALCIUM CHLORIDE 1000 ML: 600; 310; 30; 20 INJECTION, SOLUTION INTRAVENOUS at 20:25

## 2022-01-07 RX ADMIN — SODIUM CHLORIDE, POTASSIUM CHLORIDE, SODIUM LACTATE AND CALCIUM CHLORIDE 125 ML/HR: 600; 310; 30; 20 INJECTION, SOLUTION INTRAVENOUS at 23:37

## 2022-01-07 NOTE — H&P
Chief complaint   Chief Complaint   Patient presents with   • Contractions     34+3       History of Present Illness: Patient is a 26 y.o. female who presents with IUP at 34w3d weeks gestation. G5, P 0313.       Past Medical History:   Diagnosis Date   • Migraine      Blood Type: O   Fetal Gender: Female  GBS: NA   Past Surgical History:   Procedure Laterality Date   • TEAR DUCT SURGERY Bilateral 05/01/1994   • TONSILLECTOMY       Family History   Problem Relation Age of Onset   • Asthma Daughter    • Hypertension Maternal Grandmother    • COPD Paternal Grandfather    • Diabetes Paternal Grandfather      Social History     Tobacco Use   • Smoking status: Never Smoker   • Smokeless tobacco: Never Used   Vaping Use   • Vaping Use: Never used   Substance Use Topics   • Alcohol use: Never   • Drug use: Never     Medications Prior to Admission   Medication Sig Dispense Refill Last Dose   • folic acid (FOLVITE) 1 MG tablet Take 1 tablet by mouth Daily. 30 tablet 5    • NIFEdipine CC (ADALAT CC) 30 MG 24 hr tablet Take 1 tablet by mouth Daily. 30 tablet 0    • Prenatal Vit-Fe Fumarate-FA (Prenatal Vitamin) 27-0.8 MG tablet Take 1 tablet by mouth Daily. 30 tablet 9      Allergies:  Patient has no known allergies.      Vital Signs  Heart Rate:  [99] 99  Resp:  [18] 18  BP: (126)/(79) 126/79    Radiology  Imaging Results (Last 24 Hours)     ** No results found for the last 24 hours. **          Labs  Lab Results (last 24 hours)     Procedure Component Value Units Date/Time    Rapid Assay For ROM - Amniotic Fluid, Amniotic Sac [[id number]]  (Abnormal) Collected: 01/07/22 1721    Specimen: Amniotic Fluid from Amniotic Sac Updated: 01/07/22 1743     Rupture of Membranes Positive    Gonorrhea Screen - Swab, [[id number]] Resulted: 12/14/21    Specimen: Swab Updated: 01/07/22 1714     External Gonorrhea Screen NEG    Chlamydia trachomatis, Neisseria gonorrhoeae, PCR w/ confirmation - Swab, Vagina [[id number]] Resulted: 12/14/21     Specimen: Swab from Vagina Updated: 01/07/22 9318     External Chlamydia Screen NEG            Review of Systems    The following systems were reviewed and negative;  ENT, respiratory, cardiovascular, gastrointestinal, genitourinary, breast, endocrine and allergies / immunologic.      Physical Exam:      General Appearance:    Alert, cooperative, in no acute distress   Head:    Normocephalic, without obvious abnormality, atraumatic   Eyes:            Lids and lashes normal, conjunctivae and sclerae normal, no   icterus, no pallor, corneas clear, PERRLA   Ears:    Ears appear intact with no abnormalities noted   Throat:   No oral lesions, no thrush, oral mucosa moist   Neck:   No adenopathy, supple, trachea midline, no thyromegaly, no     carotid bruit, no JVD   Back:     No kyphosis present, no scoliosis present, no skin lesions,       erythema or scars, no tenderness to percussion or                   palpation,   range of motion normal   Lungs:     Clear to auscultation,respirations regular, even and                   unlabored    Heart:    Regular rhythm and normal rate, normal S1 and S2, no            murmur, no gallop, no rub, no click   Breast Exam:    Deferred   Abdomen:     Normal bowel sounds, no masses, no organomegaly, soft        non-tender, non-distended, no guarding, no rebound                 tenderness   Genitalia:    Cervix: Dilated 4 cm, 60%   Extremities:   Moves all extremities well, no edema, no cyanosis, no              redness   Pulses:   Pulses palpable and equal bilaterally   Skin:   No bleeding, bruising or rash   Lymph nodes:   No palpable adenopathy   Neurologic:   Cranial nerves 2 - 12 grossly intact, sensation intact, DTR        present and equal bilaterally         Assessment: Patient is a 26 y.o. female who presents with IUP at 34w3d weeks gestation. G 5, P 0313. Ruptured membranes..   Chief Complaint   Patient presents with   • Contractions     34+3       Plan of Care: Admit.  Proceed with augmentation of labor. GBS profilaxis      Donte White III, MD  01/07/22  17:47 EST

## 2022-01-07 NOTE — NON STRESS TEST
Dulce Boateng, a  at 34w3d with an JOSE of 2/15/2022, Date entered prior to episode creation, was seen at [facility] LABOR DELIVERY for a nonstress test.    Chief Complaint   Patient presents with   • Contractions     34+3       Patient Active Problem List   Diagnosis   •  labor   • Pregnancy   • Grade III hemorrhoids       Start Time: 1705  Stop Time: 1725    Interpretation A  Nonstress Test Interpretation A: Reactive (22 : Ivis Pandya RN)  Comments A: confirmed with ASCENCION Shankar RN (22 : Ivis Pandya, RN)       amnisure pos

## 2022-01-08 PROCEDURE — 25010000002 AMPICILLIN PER 500 MG: Performed by: OBSTETRICS & GYNECOLOGY

## 2022-01-08 PROCEDURE — 25010000002 DIPHENHYDRAMINE PER 50 MG: Performed by: OBSTETRICS & GYNECOLOGY

## 2022-01-08 RX ORDER — ACETAMINOPHEN 325 MG/1
650 TABLET ORAL EVERY 4 HOURS PRN
Status: DISCONTINUED | OUTPATIENT
Start: 2022-01-08 | End: 2022-01-08 | Stop reason: HOSPADM

## 2022-01-08 RX ORDER — DIPHENHYDRAMINE HYDROCHLORIDE 50 MG/ML
12.5 INJECTION INTRAMUSCULAR; INTRAVENOUS EVERY 6 HOURS PRN
Status: DISCONTINUED | OUTPATIENT
Start: 2022-01-08 | End: 2022-01-11 | Stop reason: HOSPADM

## 2022-01-08 RX ORDER — METHYLERGONOVINE MALEATE 0.2 MG/ML
200 INJECTION INTRAVENOUS ONCE AS NEEDED
Status: DISCONTINUED | OUTPATIENT
Start: 2022-01-08 | End: 2022-01-11 | Stop reason: HOSPADM

## 2022-01-08 RX ORDER — HYDROXYZINE 50 MG/1
50 TABLET, FILM COATED ORAL NIGHTLY PRN
Status: DISCONTINUED | OUTPATIENT
Start: 2022-01-08 | End: 2022-01-11 | Stop reason: HOSPADM

## 2022-01-08 RX ORDER — ONDANSETRON 2 MG/ML
4 INJECTION INTRAMUSCULAR; INTRAVENOUS EVERY 6 HOURS PRN
Status: DISCONTINUED | OUTPATIENT
Start: 2022-01-08 | End: 2022-01-11 | Stop reason: HOSPADM

## 2022-01-08 RX ORDER — CARBOPROST TROMETHAMINE 250 UG/ML
250 INJECTION, SOLUTION INTRAMUSCULAR AS NEEDED
Status: DISCONTINUED | OUTPATIENT
Start: 2022-01-08 | End: 2022-01-08 | Stop reason: HOSPADM

## 2022-01-08 RX ORDER — ONDANSETRON 4 MG/1
4 TABLET, FILM COATED ORAL EVERY 6 HOURS PRN
Status: DISCONTINUED | OUTPATIENT
Start: 2022-01-08 | End: 2022-01-11 | Stop reason: HOSPADM

## 2022-01-08 RX ORDER — IBUPROFEN 800 MG/1
800 TABLET ORAL EVERY 8 HOURS SCHEDULED
Status: DISCONTINUED | OUTPATIENT
Start: 2022-01-08 | End: 2022-01-11 | Stop reason: HOSPADM

## 2022-01-08 RX ORDER — HYDROCORTISONE 25 MG/G
1 CREAM TOPICAL AS NEEDED
Status: DISCONTINUED | OUTPATIENT
Start: 2022-01-08 | End: 2022-01-11 | Stop reason: HOSPADM

## 2022-01-08 RX ORDER — DOCUSATE SODIUM 100 MG/1
100 CAPSULE, LIQUID FILLED ORAL 2 TIMES DAILY
Status: DISCONTINUED | OUTPATIENT
Start: 2022-01-08 | End: 2022-01-11 | Stop reason: HOSPADM

## 2022-01-08 RX ORDER — OXYTOCIN-SODIUM CHLORIDE 0.9% IV SOLN 30 UNIT/500ML 30-0.9/5 UT/ML-%
125 SOLUTION INTRAVENOUS CONTINUOUS PRN
Status: DISCONTINUED | OUTPATIENT
Start: 2022-01-08 | End: 2022-01-08 | Stop reason: HOSPADM

## 2022-01-08 RX ORDER — HYDROCODONE BITARTRATE AND ACETAMINOPHEN 5; 325 MG/1; MG/1
1 TABLET ORAL EVERY 4 HOURS PRN
Status: DISCONTINUED | OUTPATIENT
Start: 2022-01-08 | End: 2022-01-08 | Stop reason: HOSPADM

## 2022-01-08 RX ORDER — PRENATAL VIT/IRON FUM/FOLIC AC 27MG-0.8MG
1 TABLET ORAL DAILY
Status: DISCONTINUED | OUTPATIENT
Start: 2022-01-08 | End: 2022-01-11 | Stop reason: HOSPADM

## 2022-01-08 RX ORDER — MISOPROSTOL 100 UG/1
TABLET ORAL
Status: COMPLETED
Start: 2022-01-08 | End: 2022-01-08

## 2022-01-08 RX ORDER — METHYLERGONOVINE MALEATE 0.2 MG/ML
200 INJECTION INTRAVENOUS ONCE AS NEEDED
Status: DISCONTINUED | OUTPATIENT
Start: 2022-01-08 | End: 2022-01-08 | Stop reason: HOSPADM

## 2022-01-08 RX ORDER — OXYTOCIN-SODIUM CHLORIDE 0.9% IV SOLN 30 UNIT/500ML 30-0.9/5 UT/ML-%
250 SOLUTION INTRAVENOUS CONTINUOUS
Status: ACTIVE | OUTPATIENT
Start: 2022-01-08 | End: 2022-01-08

## 2022-01-08 RX ORDER — SODIUM CHLORIDE 0.9 % (FLUSH) 0.9 %
1-10 SYRINGE (ML) INJECTION AS NEEDED
Status: DISCONTINUED | OUTPATIENT
Start: 2022-01-08 | End: 2022-01-11 | Stop reason: HOSPADM

## 2022-01-08 RX ORDER — OXYTOCIN-SODIUM CHLORIDE 0.9% IV SOLN 30 UNIT/500ML 30-0.9/5 UT/ML-%
999 SOLUTION INTRAVENOUS ONCE
Status: COMPLETED | OUTPATIENT
Start: 2022-01-08 | End: 2022-01-08

## 2022-01-08 RX ORDER — BISACODYL 10 MG
10 SUPPOSITORY, RECTAL RECTAL DAILY PRN
Status: DISCONTINUED | OUTPATIENT
Start: 2022-01-09 | End: 2022-01-11 | Stop reason: HOSPADM

## 2022-01-08 RX ORDER — IBUPROFEN 800 MG/1
800 TABLET ORAL ONCE AS NEEDED
Status: DISCONTINUED | OUTPATIENT
Start: 2022-01-08 | End: 2022-01-08 | Stop reason: HOSPADM

## 2022-01-08 RX ORDER — ONDANSETRON 4 MG/1
4 TABLET, FILM COATED ORAL EVERY 6 HOURS PRN
Status: DISCONTINUED | OUTPATIENT
Start: 2022-01-08 | End: 2022-01-08 | Stop reason: HOSPADM

## 2022-01-08 RX ORDER — ONDANSETRON 2 MG/ML
4 INJECTION INTRAMUSCULAR; INTRAVENOUS EVERY 6 HOURS PRN
Status: DISCONTINUED | OUTPATIENT
Start: 2022-01-08 | End: 2022-01-08 | Stop reason: HOSPADM

## 2022-01-08 RX ORDER — MISOPROSTOL 100 UG/1
600 TABLET ORAL AS NEEDED
Status: DISCONTINUED | OUTPATIENT
Start: 2022-01-08 | End: 2022-01-08 | Stop reason: HOSPADM

## 2022-01-08 RX ORDER — MISOPROSTOL 100 UG/1
600 TABLET ORAL ONCE AS NEEDED
Status: DISCONTINUED | OUTPATIENT
Start: 2022-01-08 | End: 2022-01-11 | Stop reason: HOSPADM

## 2022-01-08 RX ORDER — CARBOPROST TROMETHAMINE 250 UG/ML
250 INJECTION, SOLUTION INTRAMUSCULAR ONCE AS NEEDED
Status: DISCONTINUED | OUTPATIENT
Start: 2022-01-08 | End: 2022-01-11 | Stop reason: HOSPADM

## 2022-01-08 RX ORDER — HYDROCODONE BITARTRATE AND ACETAMINOPHEN 5; 325 MG/1; MG/1
1 TABLET ORAL EVERY 4 HOURS PRN
Status: DISCONTINUED | OUTPATIENT
Start: 2022-01-08 | End: 2022-01-11 | Stop reason: HOSPADM

## 2022-01-08 RX ADMIN — Medication 14 ML/HR: at 03:15

## 2022-01-08 RX ADMIN — AMPICILLIN SODIUM 2 G: 2 INJECTION, POWDER, FOR SOLUTION INTRAVENOUS at 06:35

## 2022-01-08 RX ADMIN — IBUPROFEN 800 MG: 800 TABLET, FILM COATED ORAL at 20:26

## 2022-01-08 RX ADMIN — MISOPROSTOL 600 MCG: 100 TABLET ORAL at 13:28

## 2022-01-08 RX ADMIN — BENZOCAINE 1 APPLICATION: 5.6 OINTMENT TOPICAL at 18:53

## 2022-01-08 RX ADMIN — WITCH HAZEL 1 PAD: 500 SOLUTION RECTAL; TOPICAL at 18:53

## 2022-01-08 RX ADMIN — HYDROCORTISONE 2.5% 1 APPLICATION: 25 CREAM TOPICAL at 18:53

## 2022-01-08 RX ADMIN — AMPICILLIN SODIUM 2 G: 2 INJECTION, POWDER, FOR SOLUTION INTRAVENOUS at 01:06

## 2022-01-08 RX ADMIN — SODIUM CHLORIDE, POTASSIUM CHLORIDE, SODIUM LACTATE AND CALCIUM CHLORIDE 125 ML/HR: 600; 310; 30; 20 INJECTION, SOLUTION INTRAVENOUS at 08:39

## 2022-01-08 RX ADMIN — DOCUSATE SODIUM 100 MG: 100 CAPSULE ORAL at 20:26

## 2022-01-08 RX ADMIN — AMPICILLIN SODIUM 2 G: 2 INJECTION, POWDER, FOR SOLUTION INTRAVENOUS at 12:20

## 2022-01-08 RX ADMIN — OXYTOCIN 999 ML/HR: 10 INJECTION, SOLUTION INTRAMUSCULAR; INTRAVENOUS at 13:25

## 2022-01-08 RX ADMIN — OXYTOCIN 2 MILLI-UNITS/MIN: 10 INJECTION, SOLUTION INTRAMUSCULAR; INTRAVENOUS at 07:32

## 2022-01-08 RX ADMIN — DIPHENHYDRAMINE HYDROCHLORIDE 12.5 MG: 50 INJECTION, SOLUTION INTRAMUSCULAR; INTRAVENOUS at 10:44

## 2022-01-08 RX ADMIN — Medication 14 ML/HR: at 09:52

## 2022-01-08 NOTE — ANESTHESIA PROCEDURE NOTES
Labor Epidural      Patient reassessed immediately prior to procedure    Patient location during procedure: OB  Start Time: 1/7/2022 8:11 PM  Stop Time: 1/7/2022 8:13 PM  Indication:at surgeon's request  Performed By  CRNA: Torres Shaw CRNA  Preanesthetic Checklist  Completed: patient identified, IV checked, site marked, risks and benefits discussed, surgical consent, monitors and equipment checked, pre-op evaluation and timeout performed  Prep:  Pt Position:sitting  Sterile Tech:cap, gloves, mask and sterile barrier  Prep:povidone-iodine 7.5% surgical scrub  Monitoring:blood pressure monitoring and continuous pulse oximetry  Epidural Block Procedure:  Approach:midline  Guidance:landmark technique  Location:L3-L4  Needle Type:Tuohy  Needle Gauge:18 G  Loss of Resistance Medium: saline  Loss of Resistance: 6cm  Cath Depth at skin:12 cm  Paresthesia: none  Aspiration:negative  Test Dose:negative  Number of Attempts: 1  Post Assessment:  Dressing:secured with tape and occlusive dressing applied  Pt Tolerance:patient tolerated the procedure well with no apparent complications  Complications:no

## 2022-01-08 NOTE — PLAN OF CARE
Goal Outcome Evaluation:      VSS; pt. Ambulating well independently; pt. Voids spontaneously w/o difficulty; pt. Pt. up to shower with no symptoms noted; hemorrhoids noted, tucks and creams given, can be reflected in MAR.

## 2022-01-08 NOTE — PLAN OF CARE
Goal Outcome Evaluation:    Ctx slowed throughout the night. Pt progressing toward goals. VSS. No s/s of distress ntd at this time.

## 2022-01-08 NOTE — L&D DELIVERY NOTE
Vaginal Delivery Procedure Note    Dulce Boateng  34w 4   G5,       OBGYN: Donte White MD      Pre-op Diagnosis: Complete Dilation      Anesthesia: Epidual        Detailed Description of Procedure     The patient was prepped and draped in normal sterile fashion. The head was delivered over an intact perineum. The nares and mouth were bulb suctioned. Nuchal cord reduced x1. Anterior and posterior shoulders delivered without any problems. The rest of the infant was delivered in controlled fashion. The placenta delivered intact. The patient tolerated the procedure well and went to the recovery room in stable condition.        Maternal Blood Type: O   Fetal Gender: F  Nuchal Cord: x1 Reduced  Tears: No    Amniotic Fluid Clear  Blood Cord: Yes  Estimated Blood Loss: 300cc  Placenta: Spontaneous, Delivered Intact   Uterus Explored: Yes  Membranes: AROM  APGARS: 8 & 9    Disposition: Transfer to Women's Health Floor  Condition: Stable    Donte White M.D     Date: 1/8/2022  Time: 13:28 EST

## 2022-01-08 NOTE — ANESTHESIA PREPROCEDURE EVALUATION
Anesthesia Evaluation     Patient summary reviewed and Nursing notes reviewed   no history of anesthetic complications:               Airway   Mallampati: I  TM distance: >3 FB  Neck ROM: full  No difficulty expected  Dental - normal exam     Pulmonary - negative pulmonary ROS and normal exam   Cardiovascular - negative cardio ROS and normal exam        Neuro/Psych  (+) headaches,     GI/Hepatic/Renal/Endo - negative ROS     Musculoskeletal (-) negative ROS    Abdominal  - normal exam    Bowel sounds: normal.   Substance History - negative use     OB/GYN    (+) Pregnant,         Other                        Anesthesia Plan    ASA 2     epidural       Anesthetic plan, all risks, benefits, and alternatives have been provided, discussed and informed consent has been obtained with: patient.

## 2022-01-09 LAB
BASOPHILS # BLD AUTO: 0.05 10*3/MM3 (ref 0–0.2)
BASOPHILS NFR BLD AUTO: 0.4 % (ref 0–1.5)
DEPRECATED RDW RBC AUTO: 45.1 FL (ref 37–54)
EOSINOPHIL # BLD AUTO: 0.02 10*3/MM3 (ref 0–0.4)
EOSINOPHIL NFR BLD AUTO: 0.1 % (ref 0.3–6.2)
ERYTHROCYTE [DISTWIDTH] IN BLOOD BY AUTOMATED COUNT: 13.3 % (ref 12.3–15.4)
HCT VFR BLD AUTO: 35.8 % (ref 34–46.6)
HGB BLD-MCNC: 11.1 G/DL (ref 12–15.9)
IMM GRANULOCYTES # BLD AUTO: 0.36 10*3/MM3 (ref 0–0.05)
IMM GRANULOCYTES NFR BLD AUTO: 2.5 % (ref 0–0.5)
LYMPHOCYTES # BLD AUTO: 2.2 10*3/MM3 (ref 0.7–3.1)
LYMPHOCYTES NFR BLD AUTO: 15.6 % (ref 19.6–45.3)
MCH RBC QN AUTO: 29 PG (ref 26.6–33)
MCHC RBC AUTO-ENTMCNC: 31 G/DL (ref 31.5–35.7)
MCV RBC AUTO: 93.5 FL (ref 79–97)
MONOCYTES # BLD AUTO: 1.21 10*3/MM3 (ref 0.1–0.9)
MONOCYTES NFR BLD AUTO: 8.6 % (ref 5–12)
NEUTROPHILS NFR BLD AUTO: 10.3 10*3/MM3 (ref 1.7–7)
NEUTROPHILS NFR BLD AUTO: 72.8 % (ref 42.7–76)
NRBC BLD AUTO-RTO: 0 /100 WBC (ref 0–0.2)
PLATELET # BLD AUTO: 174 10*3/MM3 (ref 140–450)
PMV BLD AUTO: 11.6 FL (ref 6–12)
RBC # BLD AUTO: 3.83 10*6/MM3 (ref 3.77–5.28)
WBC NRBC COR # BLD: 14.14 10*3/MM3 (ref 3.4–10.8)

## 2022-01-09 PROCEDURE — 85025 COMPLETE CBC W/AUTO DIFF WBC: CPT | Performed by: OBSTETRICS & GYNECOLOGY

## 2022-01-09 RX ADMIN — IBUPROFEN 800 MG: 800 TABLET, FILM COATED ORAL at 21:22

## 2022-01-09 RX ADMIN — FOLIC ACID 1000 MCG: 1 TABLET ORAL at 08:10

## 2022-01-09 RX ADMIN — IBUPROFEN 800 MG: 800 TABLET, FILM COATED ORAL at 13:42

## 2022-01-09 RX ADMIN — DOCUSATE SODIUM 100 MG: 100 CAPSULE ORAL at 08:10

## 2022-01-09 RX ADMIN — PRENATAL VIT W/ FE FUMARATE-FA TAB 27-0.8 MG 1 TABLET: 27-0.8 TAB at 08:10

## 2022-01-09 RX ADMIN — IBUPROFEN 800 MG: 800 TABLET, FILM COATED ORAL at 06:17

## 2022-01-09 RX ADMIN — DOCUSATE SODIUM 100 MG: 100 CAPSULE ORAL at 21:22

## 2022-01-09 NOTE — PLAN OF CARE
Goal Outcome Evaluation:   VSS; FF u/2.  Pt's infant in NICU; pt visiting infant.  Pain/discomfort eased with scheduled motrin.

## 2022-01-09 NOTE — PLAN OF CARE
Goal Outcome Evaluation:   Patient has been to NICU multiple times. Ambulating in room and hallway. Good appetite. Scant bleeding and minimal pain.

## 2022-01-09 NOTE — PROGRESS NOTES
Spontaneous Vaginal Delivery Progress Note      Hospital Course: G 5, now P 0414 Patient was admitted on 1/7/2022  4:55 PM with IUP at 34w4d weeks gestation secondary to Pregnancy [Z34.90]. Patient underwent a normal spontaneous vaginal delivery.       Patient stable. No complaints.     signs in last 24 hours:    Vital Signs Range for the last 24 hours              Temp:  [97.9 °F (36.6 °C)-99.2 °F (37.3 °C)] 97.9 °F (36.6 °C)  Heart Rate:  [] 64  Resp:  [18-20] 18  BP: (110-126)/(59-82) 117/82     Radiology     Imaging Results (Last 24 Hours)     ** No results found for the last 24 hours. **           Labs     Lab Results (last 24 hours)     Procedure Component Value Units Date/Time    CBC & Differential [[id number]]  (Abnormal) Collected: 01/09/22 0703    Specimen: Blood Updated: 01/09/22 0739    Narrative:      The following orders were created for panel order CBC & Differential.  Procedure                               Abnormality         Status                     ---------                               -----------         ------                     CBC Auto Differential[[id number]]        Abnormal            Final result                 Please view results for these tests on the individual orders.    CBC Auto Differential [[id number]]  (Abnormal) Collected: 01/09/22 0703    Specimen: Blood Updated: 01/09/22 0739     WBC 14.14 10*3/mm3      RBC 3.83 10*6/mm3      Hemoglobin 11.1 g/dL      Hematocrit 35.8 %      MCV 93.5 fL      MCH 29.0 pg      MCHC 31.0 g/dL      RDW 13.3 %      RDW-SD 45.1 fl      MPV 11.6 fL      Platelets 174 10*3/mm3      Neutrophil % 72.8 %      Lymphocyte % 15.6 %      Monocyte % 8.6 %      Eosinophil % 0.1 %      Basophil % 0.4 %      Immature Grans % 2.5 %      Neutrophils, Absolute 10.30 10*3/mm3      Lymphocytes, Absolute 2.20 10*3/mm3      Monocytes, Absolute 1.21 10*3/mm3      Eosinophils, Absolute 0.02 10*3/mm3      Basophils, Absolute 0.05 10*3/mm3      Immature Grans,  Absolute 0.36 10*3/mm3      nRBC 0.0 /100 WBC             Review of Systems    The following systems were reviewed and negative;  ENT, respiratory, cardiovascular, gastrointestinal, genitourinary, breast, endocrine and allergies / immunologic.      Physical Exam:      General Appearance:    Alert, cooperative, in no acute distress   Head:    Normocephalic, without obvious abnormality, atraumatic   Eyes:            Lids and lashes normal, conjunctivae and sclerae normal, no   icterus, no pallor, corneas clear, PERRLA   Ears:    Ears appear intact with no abnormalities noted   Throat:   No oral lesions, no thrush, oral mucosa moist   Neck:   No adenopathy, supple, trachea midline, no thyromegaly, no     carotid bruit, no JVD   Back:     No kyphosis present, no scoliosis present, no skin lesions,       erythema or scars, no tenderness to percussion or                   palpation,   range of motion normal   Lungs:     Clear to auscultation,respirations regular, even and                   unlabored    Heart:    Regular rhythm and normal rate, normal S1 and S2, no            murmur, no gallop, no rub, no click   Breast Exam:    Deferred   Abdomen:     Normal bowel sounds, no masses, no organomegaly, soft        non-tender, non-distended, no guarding, no rebound                 tenderness   Genitalia:    Deferred   Extremities:   Moves all extremities well, no edema, no cyanosis, no              redness   Pulses:   Pulses palpable and equal bilaterally   Skin:   No bleeding, bruising or rash   Lymph nodes:   No palpable adenopathy   Neurologic:   Cranial nerves 2 - 12 grossly intact, sensation intact, DTR        present and equal bilaterally                 Assessment:  1.  . PPD#1. Patient doing well. No complaints.     Plan:  1. Will continue current plan of care and anticipate discharge to home in the AM.      Donte White III, MD  22  10:48 EST

## 2022-01-10 PROCEDURE — 88307 TISSUE EXAM BY PATHOLOGIST: CPT | Performed by: OBSTETRICS & GYNECOLOGY

## 2022-01-10 RX ADMIN — IBUPROFEN 800 MG: 800 TABLET, FILM COATED ORAL at 21:19

## 2022-01-10 RX ADMIN — PRENATAL VIT W/ FE FUMARATE-FA TAB 27-0.8 MG 1 TABLET: 27-0.8 TAB at 09:26

## 2022-01-10 RX ADMIN — FOLIC ACID 1000 MCG: 1 TABLET ORAL at 09:26

## 2022-01-10 RX ADMIN — DOCUSATE SODIUM 100 MG: 100 CAPSULE ORAL at 21:19

## 2022-01-10 RX ADMIN — DOCUSATE SODIUM 100 MG: 100 CAPSULE ORAL at 09:26

## 2022-01-10 RX ADMIN — IBUPROFEN 800 MG: 800 TABLET, FILM COATED ORAL at 06:03

## 2022-01-10 RX ADMIN — IBUPROFEN 800 MG: 800 TABLET, FILM COATED ORAL at 13:33

## 2022-01-10 NOTE — PROGRESS NOTES
" [facility]  Vaginal Delivery Progress Note    Subjective    Postpartum Day 2: Vaginal Delivery    The patient feels well.  Denies complaints.  Lochia is light.  Baby will be staying one more night.     Objective     Vital Signs Range for the last 24 hours  Temperature: Temp:  [97.4 °F (36.3 °C)-98.1 °F (36.7 °C)] 98.1 °F (36.7 °C)   Temp Source: Temp src: Oral   BP: BP: (101-111)/(62-69) 111/69   Pulse: Heart Rate:  [72-82] 82   Respirations: Resp:  [18] 18   SPO2: SpO2:  [98 %-99 %] 98 %   O2 Amount (l/min):     O2 Devices Device (Oxygen Therapy): room air   Weight:       Admit Height:  Height: 162.6 cm (64\")      Physical Exam:  General:  no acute distresss.  Abdomen: Fundus: appropriate, firm, non tender  Extremities: normal, atraumatic, no cyanosis, and trace edema.       Lab results reviewed:  Yes       Assessment/Plan     Normal postpartum state      Plan:  Continue current care.      Griffin Matthews,   1/10/2022  10:26 EST  "

## 2022-01-10 NOTE — PLAN OF CARE
Goal Outcome Evaluation:  Patient appears to be resting in bed at this time. Vital signs are stable and is on room air. Fundus is firm and midline with minimal bleeding noted. No complaints or distress is noted at this time. Will continue to monitor.

## 2022-01-10 NOTE — NURSING NOTE
Motherhood Connection Check-In    Patient Name: Dulce Boateng   Preferred Name: Dulce   Date/Time of Contact: 01/10/2022 0815   Services: no No  Demographics Reviewed: yes  May we continue to contact you by phone: yes By Mail: yes   Providers:     Type of Provider Name Next Appointment   OB/GYN     Primary Care Provider     Dental Provider     PEDS Provider     Speciality Provider       Frequency of OB/GYN Provider Visits: [] Every 2 wks  [] wkly [] twice/wk [] Every 4 weeks [x] other:     Able to keep appts as scheduled: Yes    Questions regarding prenatal visits or tests to be ordered: no    Other Providers/Services Presently Utilizing: None    Have you seen a dentist in last 6 months: no  OB/GYN Status    Significant Other/Support Person: Name:                     Relationship:     JOSE 02/15/2022        Based Upon US    GA: 34w4d     Number of Fetuses: 1    Currently Employed: No    Patient's last menstrual period was 2021.  Estimated Date of Delivery: 2/15/22     Gender(s) & Name(s): Chirag Mills  Currently Breastfeeding: yes  Baby active/feeling fetal movement: No, decrease or absent  How are you presently feeling: Pt states she feels fine.    New Diagnosis (Also update in HX) [] Hyperemesis [] HTN (Type: [] Chronic [] Gestational) [] Pre-eclampsia [] GDM  [] PTL [] Anxiety [] Depression [] Anemia [] STI [] Other  What questions can I help to answer such as questions related to your care experience, your pregnancy, plans for delivery, any concerns, etc.: none voiced    Delivery Plans:  Method:  [] Vaginal  []  [] TOLAC   Anesthesia: [] None [] Epidural [] Spinal [] Other :    Special Considerations: [] Birth Plan []  [] Birthing Ball [] Peanut Ball [] Other:      Support Person(s): Spouse    Post-Delivery Plans:   Pediatric Provider Chosen: Yes, describe: CPA  Adoption: no Circumcision for Male Baby: no  Feeding Intentions: [] Breast Milk [] Formula [x] Breast Milk and  Formula  Own a Breast Pump: [x] No [] Manual [] Electric (Type/Brand:                   )  Birth Control: [x] Undecided [] Tubal Ligation (Discussed w/ Provider and Consent Signed: [] Yes [] No  [] Other Planned Method of Birth Control    Immunizations:  Influenza: [x] Not Yet [] Refused [] Yes        Tdap: [] Not Yet [] Refused [] Yes  Covid [x] Not Yet [] Refused [] Yes    Review for Changes in Social History  Social History:    Do you mind if I ask you questions about substance use? yes  If no, inform patient that if at some point she wants to talk about it, or needs resources, she can contact [facility].     Smoking Status: Never a smoker.  Cigarettes  Passive Exposure: no  Counseling Given: yes  Smokeless Tobacco: Never a smoker.  Alcohol Use: No   Drinks/wk: 0   Substance Use History: No  Substances Used & Use/Wk: o    [facility] Domestic Violence/Abuse Screening    Within the last year, have you been:  Humiliated or emotionally abused in other ways by your partner or ex-partner no  Afraid of your partner or ex-partner no  Raped or forced to have any kind of sexual activity by your partner or ex-partner no  Kicked, hit, slapped, or otherwise physically hurt by your partner or ex-partner no  Feels Unsafe at home or work/school: no  Feels Threatened by Someone no  Does anyone try to keep you from having contact with others/doing things outside your home: no  History of physical, mental, emotional, financial abuse/neglect: no    [place]  Depression Scale    Over the last 7 days:  I have been able to laugh and see the funny side of things: 0= As much as I always could  I have looked forward with enjoyment to things: 0= As much as I ever did  I have blamed myself unnecessarily when things went wron= No, never  I have been anxious or worried for no good reason: 1= Hardly ever  I have felt scared or panicky for no good reason: 1= No, not much  Things have been getting on top of me: 0= No, I have been coping as well  as ever  I have been so unhappy that I have had difficulty sleepin= No, not at all  I have felt sad or miserable: 0= No, not at all  I have been so unhappy that I have been cryin= No, never  The thought of harming myself has occurred to me: 0= Never  Total Score: 2    Spiritual, Cultural, Synagogue, Value Awareness  Any Spiritual, Cultural, or Synagogue Beliefs/Practices/Values that we need to be mindful of throughout your maternity experience: no  Supplies ready for baby: [x] Car Seat [x] Crib [x] Clothing [x] Diapers [x] Feeding Supplies  Resource/Environmental Concerns: None    Disability/Function  Disabilities (hearing, seeing, concentrating, communicating, comprehension, performing activities of daily living): none  Accomodations/Assistive Devices Utililzed (e.g. hearing aids, sign language, braile, service animal, /aide, etc.): none  Equipment Presently Utilized at Home: [] Breast Pump [] Glucometer [] Blood Pressure Cuff [] Other:     Preparing for Labor and Baby Education    Completed Childbirth Education Classes: [] No/Declined [] Yes [] Requests more information  Lactation Education Classes: [] No/Declined [] Yes [] Requests more information  [] No/Declined [] Actively Participating [] Requests more information [] Other    Topic Educational Information Comments   Fetal Movement [] Provided   []Acknowledged [] Refused    Choosing a Pediatrician [x] Provided   []Acknowledged [] Refused    Community Resources [x] Provided   []Acknowledged [] Refused    Hospital Education Programs [x] Provided   []Acknowledged [] Refused    [facility] Maternal-Child Department [x] Provided   []Acknowledged [] Refused    Signs or Symptoms to Report [x] Provided   []Acknowledged [] Refused    Other:Instructions for [employer] linnette registration [x] Provided   []Acknowledged [] Refused    Other:  [] Provided   []Acknowledged [] Refused    Comments:  [] Provided   []Acknowledged [] Refused      Do you have the  Rabbit Cristin?  [] YES   [x] NO     MyChart: [x] YES   [] NO     Specific Resources Provided and Method: HANDS, How to find a dentist, How to find a pediatrician, How to find a primary care provider, How to sign up for MyChart, Insurance Benefits/Incentives, Mental Health Services , Smoking/Vaping Sensation, SNAP Benefits, WIC Benefits Sent via: Handouts Given    Intake Summary  [] Periodic Check completed, will follow-up with patient:   [] Discussed community resources and information provided or assisted with appointments (see notes)  [] Other, including any provider notifications (see notes)  [] Declines further [employer] participation (see notes)  Oxana Long RN   Maternal Nurse Navigator

## 2022-01-11 VITALS
SYSTOLIC BLOOD PRESSURE: 102 MMHG | RESPIRATION RATE: 18 BRPM | OXYGEN SATURATION: 99 % | DIASTOLIC BLOOD PRESSURE: 64 MMHG | TEMPERATURE: 98 F | HEART RATE: 65 BPM | BODY MASS INDEX: 26.34 KG/M2 | HEIGHT: 64 IN | WEIGHT: 154.3 LBS

## 2022-01-11 PROBLEM — Z34.90 PREGNANCY: Status: RESOLVED | Noted: 2021-12-07 | Resolved: 2022-01-11

## 2022-01-11 RX ORDER — PSEUDOEPHEDRINE HCL 30 MG
100 TABLET ORAL 2 TIMES DAILY PRN
Qty: 40 CAPSULE | Refills: 1 | Status: SHIPPED | OUTPATIENT
Start: 2022-01-11

## 2022-01-11 RX ORDER — IBUPROFEN 800 MG/1
800 TABLET ORAL EVERY 8 HOURS PRN
Qty: 60 TABLET | Refills: 1 | Status: SHIPPED | OUTPATIENT
Start: 2022-01-11

## 2022-01-11 RX ADMIN — IBUPROFEN 800 MG: 800 TABLET, FILM COATED ORAL at 05:28

## 2022-01-11 RX ADMIN — FOLIC ACID 1000 MCG: 1 TABLET ORAL at 10:38

## 2022-01-11 RX ADMIN — DOCUSATE SODIUM 100 MG: 100 CAPSULE ORAL at 09:06

## 2022-01-11 RX ADMIN — PRENATAL VIT W/ FE FUMARATE-FA TAB 27-0.8 MG 1 TABLET: 27-0.8 TAB at 09:06

## 2022-01-11 NOTE — PLAN OF CARE
Problem: Adjustment to Role Transition (Postpartum Vaginal Delivery)  Goal: Successful Maternal Role Transition  Outcome: Adequate for Care Transition     Problem: Bleeding (Postpartum Vaginal Delivery)  Goal: Hemostasis  Outcome: Adequate for Care Transition     Problem: Infection (Postpartum Vaginal Delivery)  Goal: Absence of Infection Signs and Symptoms  Outcome: Adequate for Care Transition     Problem: Pain (Postpartum Vaginal Delivery)  Goal: Acceptable Pain Control  Outcome: Adequate for Care Transition  Intervention: Prevent or Manage Pain  Recent Flowsheet Documentation  Taken 1/11/2022 0910 by Faith Espinoza, RN  Pain Management Interventions: no interventions per patient request     Problem: Urinary Retention (Postpartum Vaginal Delivery)  Goal: Effective Urinary Elimination  Outcome: Adequate for Care Transition   Goal Outcome Evaluation:

## 2022-01-11 NOTE — PROGRESS NOTES
" Reji  Delivery Discharge Summary    Primary OB Clinician:     EDC: Estimated Date of Delivery: 2/15/22    Gestational Age:34w4d    Antepartum complications: none    Date of Delivery: 2022   Time of Delivery: 1:24 PM     Delivered By:  Donte White Iii     Delivery Type: Vaginal, Spontaneous      Baby:  female    Apgar:  8  @ 1 minute /   Apgar:  9  @ 5 minutes   Weight:  2440 g (5 lb 6.1 oz)     Anesthesia: Epidural      Intrapartum complications:  contractions and delivery    Rh Immune globulin given: not applicable    Subjective   Subjective  Postpartum Day 2: Vaginal Delivery    The patient feels well.  Her pain is well controlled with nonsteroidal anti-inflammatory drugs.   She is ambulating well.  Patient describes her bleeding as thin lochia.    Breastfeeding: with difficulty.    Objective     Objective   Vital Signs Range for the last 24 hours  Temperature: Temp:  [98 °F (36.7 °C)-98.5 °F (36.9 °C)] 98 °F (36.7 °C)   Temp Source: Temp src: Oral   BP: BP: (102-104)/(61-64) 102/64   Pulse: Heart Rate:  [65-73] 65   Respirations: Resp:  [18] 18   Weight:       Admit Height:  Height: 162.6 cm (64\")    Physical Exam:  General:  no acute distress.  Abdomen: Fundus: appropriate, firm, non tender  Extremities: normal, atraumatic, no cyanosis, and trace edema.     [unfilled]       Lab Results   Component Value Date    ABO O 2022    RH Positive 2022        Lab Results   Component Value Date    HGB 11.1 (L) 2022    HCT 35.8 2022         Assesment and Plan:      Postpartum care following vaginal delivery    Assessment & Plan    Plan:    Outpatient Lactation Referral ordered if needed by patient.   Follow-up appointment with [facility] in 3 weeks.    Discharge Date: 2022; Discharge Time: 09:05 EST        AMANDA Gar  2022  09:05 EST  "

## 2022-01-11 NOTE — DISCHARGE SUMMARY
" Reji  Delivery Discharge Summary    Primary OB Clinician:     EDC: Estimated Date of Delivery: 2/15/22    Gestational Age:34w4d    Antepartum complications: none    Date of Delivery: 2022   Time of Delivery: 1:24 PM     Delivered By:  Donte White Iii     Delivery Type: Vaginal, Spontaneous      Baby:  female    Apgar:  8  @ 1 minute /   Apgar:  9  @ 5 minutes   Weight:  2440 g (5 lb 6.1 oz)     Anesthesia: Epidural      Intrapartum complications:  contractions and delivery    Rh Immune globulin given: not applicable    Subjective   Subjective  Postpartum Day 3: Vaginal Delivery    The patient feels well.  Her pain is well controlled with nonsteroidal anti-inflammatory drugs.   She is ambulating well.  Patient describes her bleeding as thin lochia.    Breastfeeding: with difficulty.    Objective     Objective   Vital Signs Range for the last 24 hours  Temperature: Temp:  [98 °F (36.7 °C)-98.5 °F (36.9 °C)] 98 °F (36.7 °C)   Temp Source: Temp src: Oral   BP: BP: (102-104)/(61-64) 102/64   Pulse: Heart Rate:  [65-73] 65   Respirations: Resp:  [18] 18   Weight:       Admit Height:  Height: 162.6 cm (64\")    Physical Exam:  General:  no acute distress.  Abdomen: Fundus: appropriate, firm, non tender  Extremities: normal, atraumatic, no cyanosis, and trace edema.     [unfilled]       Lab Results   Component Value Date    ABO O 2022    RH Positive 2022        Lab Results   Component Value Date    HGB 11.1 (L) 2022    HCT 35.8 2022         Assesment and Plan:      Postpartum care following vaginal delivery    Assessment & Plan    Plan:    Outpatient Lactation Referral ordered if needed by patient.   Follow-up appointment with [facility] in 3 weeks.    Discharge Date: 2022; Discharge Time: 09:05 EST        AMANDA Gar  2022  09:05 EST  "

## 2022-01-11 NOTE — PLAN OF CARE
Problem: Adjustment to Role Transition (Postpartum Vaginal Delivery)  Goal: Successful Maternal Role Transition  Outcome: Ongoing, Progressing     Problem: Bleeding (Postpartum Vaginal Delivery)  Goal: Hemostasis  Outcome: Ongoing, Progressing     Problem: Infection (Postpartum Vaginal Delivery)  Goal: Absence of Infection Signs and Symptoms  Outcome: Ongoing, Progressing     Problem: Pain (Postpartum Vaginal Delivery)  Goal: Acceptable Pain Control  Outcome: Ongoing, Progressing     Problem: Urinary Retention (Postpartum Vaginal Delivery)  Goal: Effective Urinary Elimination  Outcome: Ongoing, Progressing   Goal Outcome Evaluation:

## 2022-01-12 LAB
LAB AP CASE REPORT: NORMAL
LAB AP CLINICAL INFORMATION: NORMAL
PATH REPORT.FINAL DX SPEC: NORMAL

## 2022-01-14 ENCOUNTER — TELEPHONE (OUTPATIENT)
Dept: OBSTETRICS AND GYNECOLOGY | Facility: HOSPITAL | Age: 27
End: 2022-01-14

## 2022-01-14 NOTE — TELEPHONE ENCOUNTER
Postpartum Maternal Virtual Visit Form  Dulce Boateng  [id number]  [unfilled]       Prenatal, Intrapartum, Postpartum Summary:       OB Provider: Dr. White    Other Providers: [facility]    Other Services Used: [facility]       Prenatal Diagnoses:  Labor    : 5     Para: 4      Gender: female       Medications: Cannot display prior to admission medications because the patient has not been admitted in this contact.         Ibuprofen, PNV       Labor/Delivery Complications: PROM    Gestation at birth: 34w4d    Postpartum Complications: None    Delivery Method: Vaginal    Pain Relief Options During Hospital Stay: oral pain medication     Smoking Status: Never smoker    Substance Use: Denies substance use/abuse       [place] Feeding Plan: formula:   Similac Advance     Primary Language: English     Other: NA       Postpartum Depression Scale:       I am able to laugh and see the funny side of things: 0 = As much as I ever did    I can look forward to things with enjoyment: 0 = As much as I ever did    I blame myself unnecessarily when things went wron = Never    I find myself anxious of worried for no good reason: 0 = Never    I feel scared or panicky for no good reason: 1 = Not very often    Things have been getting on top of me: 0 = Never    I have been so unhappy that I have difficulty sleepin = Never    I feel (or have felt) sad or miserable: 0 = Never    I have been so unhappy that I have been cryin = Never    I have had thoughts of harming myself: 0 = Never    Postpartum Depression Scale Total Score: 1          Pain/Comfort/Sleep:     Pain Rating (Numeric Scale 1 - 10)  Current :  0    At Rest:  0    With Activity:  2    Acceptable Pain Level:  3    Pain Location: lower abdomen    Pain Description: intermittent and cramping    Pain Management Strategies (How do you treat your pain?): medication         Coping/Psychosocial:       Verbalized Emotional State:  happiness    Family/Support Network:  spouse and family    Level of Involvement in Care: attentive, interactive and supportive       Safety:       Do you feel comfortable in your relationship with your baby?:  Yes    Have members of your household adjusted to your baby?: Yes    Is the baby's father supportive and/or involved with the baby?: Yes    How does your partner feel about the baby?: happy     Do you feel safe at home, school and work?: Yes    Are you in a relationship with someone who threatens you or hurts you?: No    Do you have the resources to keep yourself and your baby healthy and safe?: Yes       Review of Systems:       History obtained from the patient       Reproductive:       Lochia (per patient report):              Color:  brownish-red              Amount:  scant              Odor:  none    Breast (per patient report):              Left Breast:  soft              Right Breast:   soft              Left Nipple:  intact              Right Nipple:  intact              Breast Care:  supportive bra       Provider Notification:       Provider Name: [facility]    Reason for Notification: NA    Response: NA       Education Discussion:       Postpartum Depression Screening:  Education provided    Peripheral Blood Glucose:  NA    Doctor Appointments:   Pt states she has appointments for both herself and her infant.    Car Seat Safety:  NA    Immunization Schedule:  Per MD    Breast Feeding:  Declined    Infant Safety:  Education provided    Family Planning:  Declined    Postpartum Care:  Education provided    S&S to report:  Education provided    Comments:  Spoke with patient over the phone. Pt states both she and her infant are doing well at home. Pt reports infant is tolerating feedings well and is having good voids and stools. Pt reports she has minimal PP bleeding and pain. Pt states she has all needed supplies and resources for herself and her infant. Pt denies any needs or concerns at this time.       Follow-Up Appoinments:       Follow-up  Appointments made:  Yes -  Appointment Date: 01/25/2022      Provider/Agency: [facility]    Well Child Visit Appointment made:  Yes -  Appointment Date: 01/14/2022      Provider/Agency: [facility]       Visit Summary:       Visit Summary:  Visit completed: No referral needed       Additional Notes:         Oxana Long RN,  01/14/22 - 10:19 AM EST

## 2022-01-31 ENCOUNTER — TRANSCRIBE ORDERS (OUTPATIENT)
Dept: LAB | Facility: HOSPITAL | Age: 27
End: 2022-01-31

## 2022-01-31 ENCOUNTER — HOSPITAL ENCOUNTER (OUTPATIENT)
Facility: HOSPITAL | Age: 27
Setting detail: HOSPITAL OUTPATIENT SURGERY
End: 2022-01-31
Attending: OBSTETRICS & GYNECOLOGY | Admitting: OBSTETRICS & GYNECOLOGY

## 2022-01-31 DIAGNOSIS — Z01.818 OTHER SPECIFIED PRE-OPERATIVE EXAMINATION: Primary | ICD-10-CM

## 2022-03-06 ENCOUNTER — PREP FOR SURGERY (OUTPATIENT)
Dept: OTHER | Facility: HOSPITAL | Age: 27
End: 2022-03-06

## 2022-03-06 DIAGNOSIS — Z30.2 ENCOUNTER FOR FEMALE STERILIZATION PROCEDURE: Primary | ICD-10-CM

## 2022-03-06 RX ORDER — SODIUM CHLORIDE 0.9 % (FLUSH) 0.9 %
3 SYRINGE (ML) INJECTION EVERY 12 HOURS SCHEDULED
Status: CANCELLED | OUTPATIENT
Start: 2022-03-06

## 2022-03-06 RX ORDER — SODIUM CHLORIDE 0.9 % (FLUSH) 0.9 %
10 SYRINGE (ML) INJECTION AS NEEDED
Status: CANCELLED | OUTPATIENT
Start: 2022-03-06

## 2022-03-07 ENCOUNTER — APPOINTMENT (OUTPATIENT)
Dept: PREADMISSION TESTING | Facility: HOSPITAL | Age: 27
End: 2022-03-07

## 2022-03-07 ENCOUNTER — LAB (OUTPATIENT)
Dept: LAB | Facility: HOSPITAL | Age: 27
End: 2022-03-07

## 2022-03-07 DIAGNOSIS — Z01.818 OTHER SPECIFIED PRE-OPERATIVE EXAMINATION: ICD-10-CM

## 2022-03-07 NOTE — DISCHARGE INSTRUCTIONS

## 2024-03-11 ENCOUNTER — TRANSCRIBE ORDERS (OUTPATIENT)
Dept: ADMINISTRATIVE | Facility: HOSPITAL | Age: 29
End: 2024-03-11
Payer: MEDICAID

## 2024-03-11 ENCOUNTER — LAB (OUTPATIENT)
Dept: LAB | Facility: HOSPITAL | Age: 29
End: 2024-03-11
Payer: MEDICAID

## 2024-03-11 DIAGNOSIS — N92.6 IRREGULAR MENSTRUAL CYCLE: ICD-10-CM

## 2024-03-11 DIAGNOSIS — E55.9 AVITAMINOSIS D: ICD-10-CM

## 2024-03-11 DIAGNOSIS — Z51.81 ENCOUNTER FOR THERAPEUTIC DRUG MONITORING: ICD-10-CM

## 2024-03-11 DIAGNOSIS — R53.83 TIREDNESS: ICD-10-CM

## 2024-03-11 DIAGNOSIS — R73.9 BLOOD GLUCOSE ELEVATED: ICD-10-CM

## 2024-03-11 DIAGNOSIS — E53.9 VITAMIN B-COMPLEX DEFICIENCY: ICD-10-CM

## 2024-03-11 DIAGNOSIS — R73.9 BLOOD GLUCOSE ELEVATED: Primary | ICD-10-CM

## 2024-03-11 LAB
ALBUMIN SERPL-MCNC: 4.6 G/DL (ref 3.5–5.2)
ALBUMIN/GLOB SERPL: 1.5 G/DL
ALP SERPL-CCNC: 64 U/L (ref 39–117)
ALT SERPL W P-5'-P-CCNC: 20 U/L (ref 1–33)
ANION GAP SERPL CALCULATED.3IONS-SCNC: 11.2 MMOL/L (ref 5–15)
AST SERPL-CCNC: 15 U/L (ref 1–32)
BASOPHILS # BLD AUTO: 0.02 10*3/MM3 (ref 0–0.2)
BASOPHILS NFR BLD AUTO: 0.4 % (ref 0–1.5)
BILIRUB SERPL-MCNC: 0.2 MG/DL (ref 0–1.2)
BUN SERPL-MCNC: 9 MG/DL (ref 6–20)
BUN/CREAT SERPL: 14.5 (ref 7–25)
CALCIUM SPEC-SCNC: 9.2 MG/DL (ref 8.6–10.5)
CHLORIDE SERPL-SCNC: 104 MMOL/L (ref 98–107)
CO2 SERPL-SCNC: 26.8 MMOL/L (ref 22–29)
CORTIS SERPL-MCNC: 7.28 MCG/DL
CREAT SERPL-MCNC: 0.62 MG/DL (ref 0.57–1)
DEPRECATED RDW RBC AUTO: 42.2 FL (ref 37–54)
EGFRCR SERPLBLD CKD-EPI 2021: 123 ML/MIN/1.73
EOSINOPHIL # BLD AUTO: 0.01 10*3/MM3 (ref 0–0.4)
EOSINOPHIL NFR BLD AUTO: 0.2 % (ref 0.3–6.2)
ERYTHROCYTE [DISTWIDTH] IN BLOOD BY AUTOMATED COUNT: 12 % (ref 12.3–15.4)
GLOBULIN UR ELPH-MCNC: 3 GM/DL
GLUCOSE SERPL-MCNC: 78 MG/DL (ref 65–99)
HBA1C MFR BLD: 4.9 % (ref 4.8–5.6)
HCT VFR BLD AUTO: 46.2 % (ref 34–46.6)
HGB BLD-MCNC: 14.8 G/DL (ref 12–15.9)
IMM GRANULOCYTES # BLD AUTO: 0.02 10*3/MM3 (ref 0–0.05)
IMM GRANULOCYTES NFR BLD AUTO: 0.4 % (ref 0–0.5)
LYMPHOCYTES # BLD AUTO: 1.69 10*3/MM3 (ref 0.7–3.1)
LYMPHOCYTES NFR BLD AUTO: 30 % (ref 19.6–45.3)
MCH RBC QN AUTO: 30.2 PG (ref 26.6–33)
MCHC RBC AUTO-ENTMCNC: 32 G/DL (ref 31.5–35.7)
MCV RBC AUTO: 94.3 FL (ref 79–97)
MONOCYTES # BLD AUTO: 0.47 10*3/MM3 (ref 0.1–0.9)
MONOCYTES NFR BLD AUTO: 8.3 % (ref 5–12)
NEUTROPHILS NFR BLD AUTO: 3.42 10*3/MM3 (ref 1.7–7)
NEUTROPHILS NFR BLD AUTO: 60.7 % (ref 42.7–76)
NRBC BLD AUTO-RTO: 0 /100 WBC (ref 0–0.2)
PLATELET # BLD AUTO: 231 10*3/MM3 (ref 140–450)
PMV BLD AUTO: 10.3 FL (ref 6–12)
POTASSIUM SERPL-SCNC: 3.7 MMOL/L (ref 3.5–5.2)
PROT SERPL-MCNC: 7.6 G/DL (ref 6–8.5)
RBC # BLD AUTO: 4.9 10*6/MM3 (ref 3.77–5.28)
SODIUM SERPL-SCNC: 142 MMOL/L (ref 136–145)
T4 FREE SERPL-MCNC: 1.29 NG/DL (ref 0.93–1.7)
TSH SERPL DL<=0.05 MIU/L-ACNC: 2.13 UIU/ML (ref 0.27–4.2)
WBC NRBC COR # BLD AUTO: 5.63 10*3/MM3 (ref 3.4–10.8)

## 2024-03-11 PROCEDURE — 36415 COLL VENOUS BLD VENIPUNCTURE: CPT

## 2024-03-11 PROCEDURE — 82306 VITAMIN D 25 HYDROXY: CPT

## 2024-03-11 PROCEDURE — 84402 ASSAY OF FREE TESTOSTERONE: CPT

## 2024-03-11 PROCEDURE — 83525 ASSAY OF INSULIN: CPT

## 2024-03-11 PROCEDURE — 82607 VITAMIN B-12: CPT

## 2024-03-11 PROCEDURE — 82533 TOTAL CORTISOL: CPT

## 2024-03-11 PROCEDURE — 84144 ASSAY OF PROGESTERONE: CPT

## 2024-03-11 PROCEDURE — 84403 ASSAY OF TOTAL TESTOSTERONE: CPT

## 2024-03-11 PROCEDURE — 82670 ASSAY OF TOTAL ESTRADIOL: CPT

## 2024-03-11 PROCEDURE — 83036 HEMOGLOBIN GLYCOSYLATED A1C: CPT

## 2024-03-11 PROCEDURE — 84443 ASSAY THYROID STIM HORMONE: CPT

## 2024-03-11 PROCEDURE — 80053 COMPREHEN METABOLIC PANEL: CPT

## 2024-03-11 PROCEDURE — 85025 COMPLETE CBC W/AUTO DIFF WBC: CPT

## 2024-03-11 PROCEDURE — 84439 ASSAY OF FREE THYROXINE: CPT

## 2024-03-12 LAB
25(OH)D3 SERPL-MCNC: 15.9 NG/ML (ref 30–100)
ESTRADIOL SERPL HS-MCNC: 34.1 PG/ML
INSULIN SERPL-ACNC: 19.5 UIU/ML (ref 2.6–24.9)
PROGEST SERPL-MCNC: 0.06 NG/ML
VIT B12 BLD-MCNC: 530 PG/ML (ref 211–946)

## 2024-03-15 LAB
TESTOST FREE SERPL-MCNC: 0.6 PG/ML (ref 0–4.2)
TESTOST SERPL-MCNC: 23 NG/DL (ref 13–71)

## 2025-08-23 ENCOUNTER — HOSPITAL ENCOUNTER (OUTPATIENT)
Dept: GENERAL RADIOLOGY | Facility: HOSPITAL | Age: 30
Discharge: HOME | End: 2025-08-23
Payer: COMMERCIAL

## 2025-08-23 ENCOUNTER — TRANSCRIBE ORDERS (OUTPATIENT)
Dept: GENERAL RADIOLOGY | Facility: HOSPITAL | Age: 30
End: 2025-08-23
Payer: COMMERCIAL

## 2025-08-23 DIAGNOSIS — M25.571 ARTHRALGIA OF RIGHT ANKLE: ICD-10-CM

## 2025-08-23 DIAGNOSIS — M25.571 ARTHRALGIA OF RIGHT ANKLE: Primary | ICD-10-CM

## 2025-08-23 DIAGNOSIS — M25.374 SUBTALAR JOINT INSTABILITY, RIGHT: ICD-10-CM

## 2025-08-23 PROCEDURE — 73600 X-RAY EXAM OF ANKLE: CPT

## 2025-08-23 PROCEDURE — 73630 X-RAY EXAM OF FOOT: CPT
